# Patient Record
Sex: FEMALE | Race: WHITE | NOT HISPANIC OR LATINO | URBAN - METROPOLITAN AREA
[De-identification: names, ages, dates, MRNs, and addresses within clinical notes are randomized per-mention and may not be internally consistent; named-entity substitution may affect disease eponyms.]

---

## 2023-03-02 ENCOUNTER — HOSPITAL ENCOUNTER (INPATIENT)
Facility: HOSPITAL | Age: 28
LOS: 2 days | Discharge: HOME | End: 2023-03-04
Attending: OBSTETRICS & GYNECOLOGY | Admitting: STUDENT IN AN ORGANIZED HEALTH CARE EDUCATION/TRAINING PROGRAM

## 2023-03-02 ENCOUNTER — ANESTHESIA (INPATIENT)
Dept: ANESTHESIOLOGY | Facility: HOSPITAL | Age: 28
End: 2023-03-02

## 2023-03-02 ENCOUNTER — ANESTHESIA EVENT (INPATIENT)
Dept: ANESTHESIOLOGY | Facility: HOSPITAL | Age: 28
End: 2023-03-02

## 2023-03-02 DIAGNOSIS — D64.9 ANEMIA, UNSPECIFIED TYPE: ICD-10-CM

## 2023-03-02 PROBLEM — F17.200 SMOKING: Status: ACTIVE | Noted: 2023-03-02

## 2023-03-02 PROBLEM — O09.30 NO PRENATAL CARE IN CURRENT PREGNANCY: Status: ACTIVE | Noted: 2023-03-02

## 2023-03-02 PROBLEM — O60.00 PRETERM LABOR: Status: ACTIVE | Noted: 2023-03-02

## 2023-03-02 PROBLEM — R03.0 ELEVATED BLOOD PRESSURE READING WITHOUT DIAGNOSIS OF HYPERTENSION: Status: ACTIVE | Noted: 2023-03-02

## 2023-03-02 PROBLEM — O14.10 SEVERE PREECLAMPSIA: Status: ACTIVE | Noted: 2023-03-02

## 2023-03-02 LAB
ABO GROUP BLD: NORMAL
ABO GROUP BLD: NORMAL
ALBUMIN SERPL BCP-MCNC: 2.3 G/DL (ref 3.5–5)
ALBUMIN SERPL BCP-MCNC: 2.6 G/DL (ref 3.5–5)
ALBUMIN SERPL BCP-MCNC: 2.9 G/DL (ref 3.5–5)
ALP SERPL-CCNC: 164 U/L (ref 34–104)
ALP SERPL-CCNC: 203 U/L (ref 34–104)
ALP SERPL-CCNC: 224 U/L (ref 34–104)
ALT SERPL W P-5'-P-CCNC: 10 U/L (ref 7–52)
ALT SERPL W P-5'-P-CCNC: 10 U/L (ref 7–52)
ALT SERPL W P-5'-P-CCNC: 9 U/L (ref 7–52)
AMPHETAMINES SERPL QL SCN: POSITIVE
AMPHETAMINES SERPL QL SCN: POSITIVE
ANION GAP SERPL CALCULATED.3IONS-SCNC: 11 MMOL/L (ref 4–13)
ANION GAP SERPL CALCULATED.3IONS-SCNC: 8 MMOL/L (ref 4–13)
ANION GAP SERPL CALCULATED.3IONS-SCNC: 8 MMOL/L (ref 4–13)
APTT PPP: 24 SECONDS (ref 23–37)
AST SERPL W P-5'-P-CCNC: 15 U/L (ref 13–39)
AST SERPL W P-5'-P-CCNC: 16 U/L (ref 13–39)
AST SERPL W P-5'-P-CCNC: 17 U/L (ref 13–39)
BACTERIA UR QL AUTO: ABNORMAL /HPF
BARBITURATES UR QL: NEGATIVE
BARBITURATES UR QL: NEGATIVE
BASE EXCESS BLDCOA CALC-SCNC: -3.6 MMOL/L (ref 3–11)
BASE EXCESS BLDCOV CALC-SCNC: -2.5 MMOL/L (ref 1–9)
BENZODIAZ UR QL: NEGATIVE
BENZODIAZ UR QL: NEGATIVE
BILIRUB SERPL-MCNC: 0.29 MG/DL (ref 0.2–1)
BILIRUB SERPL-MCNC: 0.31 MG/DL (ref 0.2–1)
BILIRUB SERPL-MCNC: 0.41 MG/DL (ref 0.2–1)
BILIRUB UR QL STRIP: NEGATIVE
BLD GP AB SCN SERPL QL: NEGATIVE
BUN SERPL-MCNC: 10 MG/DL (ref 5–25)
BUN SERPL-MCNC: 10 MG/DL (ref 5–25)
BUN SERPL-MCNC: 9 MG/DL (ref 5–25)
CALCIUM ALBUM COR SERPL-MCNC: 8.4 MG/DL (ref 8.3–10.1)
CALCIUM ALBUM COR SERPL-MCNC: 8.9 MG/DL (ref 8.3–10.1)
CALCIUM ALBUM COR SERPL-MCNC: 9.7 MG/DL (ref 8.3–10.1)
CALCIUM SERPL-MCNC: 7 MG/DL (ref 8.4–10.2)
CALCIUM SERPL-MCNC: 8 MG/DL (ref 8.4–10.2)
CALCIUM SERPL-MCNC: 8.6 MG/DL (ref 8.4–10.2)
CHLORIDE SERPL-SCNC: 103 MMOL/L (ref 96–108)
CHLORIDE SERPL-SCNC: 103 MMOL/L (ref 96–108)
CHLORIDE SERPL-SCNC: 105 MMOL/L (ref 96–108)
CLARITY UR: CLEAR
CO2 SERPL-SCNC: 20 MMOL/L (ref 21–32)
CO2 SERPL-SCNC: 22 MMOL/L (ref 21–32)
CO2 SERPL-SCNC: 23 MMOL/L (ref 21–32)
COCAINE UR QL: NEGATIVE
COCAINE UR QL: NEGATIVE
COLOR UR: ABNORMAL
CREAT SERPL-MCNC: 0.53 MG/DL (ref 0.6–1.3)
CREAT SERPL-MCNC: 0.59 MG/DL (ref 0.6–1.3)
CREAT SERPL-MCNC: 0.7 MG/DL (ref 0.6–1.3)
CREAT UR-MCNC: 107.1 MG/DL
ERYTHROCYTE [DISTWIDTH] IN BLOOD BY AUTOMATED COUNT: 14.2 % (ref 11.6–15.1)
ERYTHROCYTE [DISTWIDTH] IN BLOOD BY AUTOMATED COUNT: 14.5 % (ref 11.6–15.1)
ERYTHROCYTE [DISTWIDTH] IN BLOOD BY AUTOMATED COUNT: 14.7 % (ref 11.6–15.1)
EXTERNAL GROUP B STREP ANTIGEN: NORMAL
FIBRINOGEN PPP-MCNC: 508 MG/DL (ref 227–495)
GFR SERPL CREATININE-BSD FRML MDRD: 119 ML/MIN/1.73SQ M
GFR SERPL CREATININE-BSD FRML MDRD: 126 ML/MIN/1.73SQ M
GFR SERPL CREATININE-BSD FRML MDRD: 130 ML/MIN/1.73SQ M
GLUCOSE SERPL-MCNC: 102 MG/DL (ref 65–140)
GLUCOSE SERPL-MCNC: 103 MG/DL (ref 65–140)
GLUCOSE SERPL-MCNC: 104 MG/DL (ref 65–140)
GLUCOSE SERPL-MCNC: 105 MG/DL (ref 65–140)
GLUCOSE SERPL-MCNC: 108 MG/DL (ref 65–140)
GLUCOSE SERPL-MCNC: 109 MG/DL (ref 65–140)
GLUCOSE SERPL-MCNC: 110 MG/DL (ref 65–140)
GLUCOSE SERPL-MCNC: 111 MG/DL (ref 65–140)
GLUCOSE SERPL-MCNC: 114 MG/DL (ref 65–140)
GLUCOSE SERPL-MCNC: 114 MG/DL (ref 65–140)
GLUCOSE SERPL-MCNC: 162 MG/DL (ref 65–140)
GLUCOSE SERPL-MCNC: 83 MG/DL (ref 65–140)
GLUCOSE SERPL-MCNC: 92 MG/DL (ref 65–140)
GLUCOSE UR STRIP-MCNC: NEGATIVE MG/DL
HBV SURFACE AG SER QL: NORMAL
HCO3 BLDCOA-SCNC: 23.7 MMOL/L (ref 17.3–27.3)
HCO3 BLDCOV-SCNC: 23.6 MMOL/L (ref 12.2–28.6)
HCT VFR BLD AUTO: 24.9 % (ref 34.8–46.1)
HCT VFR BLD AUTO: 30.1 % (ref 34.8–46.1)
HCT VFR BLD AUTO: 34.1 % (ref 34.8–46.1)
HCV AB SER QL: NORMAL
HGB BLD-MCNC: 10.6 G/DL (ref 11.5–15.4)
HGB BLD-MCNC: 7.7 G/DL (ref 11.5–15.4)
HGB BLD-MCNC: 9.5 G/DL (ref 11.5–15.4)
HGB UR QL STRIP.AUTO: ABNORMAL
HIV 1+2 AB+HIV1 P24 AG SERPL QL IA: NORMAL
HIV1 P24 AG SER QL: NORMAL
HYALINE CASTS #/AREA URNS LPF: ABNORMAL /LPF
INR PPP: 0.91 (ref 0.84–1.19)
KETONES UR STRIP-MCNC: NEGATIVE MG/DL
LEUKOCYTE ESTERASE UR QL STRIP: NEGATIVE
MCH RBC QN AUTO: 26.8 PG (ref 26.8–34.3)
MCH RBC QN AUTO: 27.1 PG (ref 26.8–34.3)
MCH RBC QN AUTO: 27.3 PG (ref 26.8–34.3)
MCHC RBC AUTO-ENTMCNC: 30.9 G/DL (ref 31.4–37.4)
MCHC RBC AUTO-ENTMCNC: 31.1 G/DL (ref 31.4–37.4)
MCHC RBC AUTO-ENTMCNC: 31.6 G/DL (ref 31.4–37.4)
MCV RBC AUTO: 86 FL (ref 82–98)
MCV RBC AUTO: 86 FL (ref 82–98)
MCV RBC AUTO: 88 FL (ref 82–98)
METHADONE UR QL: NEGATIVE
METHADONE UR QL: NEGATIVE
MUCOUS THREADS UR QL AUTO: ABNORMAL
NITRITE UR QL STRIP: NEGATIVE
NON-SQ EPI CELLS URNS QL MICRO: ABNORMAL /HPF
O2 CT VFR BLDCOA CALC: 8.4 ML/DL
OPIATES UR QL SCN: NEGATIVE
OPIATES UR QL SCN: NEGATIVE
OXYCODONE+OXYMORPHONE UR QL SCN: NEGATIVE
OXYCODONE+OXYMORPHONE UR QL SCN: NEGATIVE
OXYHGB MFR BLDCOA: 37.4 %
OXYHGB MFR BLDCOV: 65.3 %
PCO2 BLDCOA: 51.1 MM[HG] (ref 30–60)
PCO2 BLDCOV: 45.4 MM HG (ref 27–43)
PCP UR QL: NEGATIVE
PCP UR QL: NEGATIVE
PH BLDCOA: 7.28 [PH] (ref 7.23–7.43)
PH BLDCOV: 7.33 [PH] (ref 7.19–7.49)
PH UR STRIP.AUTO: 6.5 [PH]
PLATELET # BLD AUTO: 203 THOUSANDS/UL (ref 149–390)
PLATELET # BLD AUTO: 228 THOUSANDS/UL (ref 149–390)
PLATELET # BLD AUTO: 243 THOUSANDS/UL (ref 149–390)
PMV BLD AUTO: 12 FL (ref 8.9–12.7)
PMV BLD AUTO: 12 FL (ref 8.9–12.7)
PMV BLD AUTO: 12.4 FL (ref 8.9–12.7)
PO2 BLDCOA: 18.5 MM HG (ref 5–25)
PO2 BLDCOV: 27.5 MM HG (ref 15–45)
POTASSIUM SERPL-SCNC: 4.3 MMOL/L (ref 3.5–5.3)
POTASSIUM SERPL-SCNC: 4.4 MMOL/L (ref 3.5–5.3)
POTASSIUM SERPL-SCNC: 4.8 MMOL/L (ref 3.5–5.3)
PROT SERPL-MCNC: 5 G/DL (ref 6.4–8.4)
PROT SERPL-MCNC: 5.7 G/DL (ref 6.4–8.4)
PROT SERPL-MCNC: 6.6 G/DL (ref 6.4–8.4)
PROT UR STRIP-MCNC: ABNORMAL MG/DL
PROT UR-MCNC: 467 MG/DL
PROT/CREAT UR: 4.36 MG/G{CREAT} (ref 0–0.1)
PROTHROMBIN TIME: 12.5 SECONDS (ref 11.6–14.5)
RBC # BLD AUTO: 2.82 MILLION/UL (ref 3.81–5.12)
RBC # BLD AUTO: 3.51 MILLION/UL (ref 3.81–5.12)
RBC # BLD AUTO: 3.95 MILLION/UL (ref 3.81–5.12)
RBC #/AREA URNS AUTO: ABNORMAL /HPF
RH BLD: POSITIVE
RH BLD: POSITIVE
RUBV IGG SERPL IA-ACNC: 12.6 IU/ML
SAO2 % BLDCOV: 14.2 ML/DL
SODIUM SERPL-SCNC: 133 MMOL/L (ref 135–147)
SODIUM SERPL-SCNC: 134 MMOL/L (ref 135–147)
SODIUM SERPL-SCNC: 136 MMOL/L (ref 135–147)
SP GR UR STRIP.AUTO: 1.02 (ref 1–1.03)
SPECIMEN EXPIRATION DATE: NORMAL
THC UR QL: NEGATIVE
THC UR QL: NEGATIVE
TREPONEMA PALLIDUM IGG+IGM AB [PRESENCE] IN SERUM OR PLASMA BY IMMUNOASSAY: NORMAL
UROBILINOGEN UR STRIP-ACNC: <2 MG/DL
WBC # BLD AUTO: 12.05 THOUSAND/UL (ref 4.31–10.16)
WBC # BLD AUTO: 16.75 THOUSAND/UL (ref 4.31–10.16)
WBC # BLD AUTO: 28.86 THOUSAND/UL (ref 4.31–10.16)
WBC #/AREA URNS AUTO: ABNORMAL /HPF

## 2023-03-02 PROCEDURE — 0KQM0ZZ REPAIR PERINEUM MUSCLE, OPEN APPROACH: ICD-10-PCS | Performed by: OBSTETRICS & GYNECOLOGY

## 2023-03-02 RX ORDER — LABETALOL HYDROCHLORIDE 5 MG/ML
40 INJECTION, SOLUTION INTRAVENOUS ONCE
Status: COMPLETED | OUTPATIENT
Start: 2023-03-02 | End: 2023-03-02

## 2023-03-02 RX ORDER — MAGNESIUM SULFATE HEPTAHYDRATE 40 MG/ML
2 INJECTION, SOLUTION INTRAVENOUS ONCE
Status: COMPLETED | OUTPATIENT
Start: 2023-03-02 | End: 2023-03-02

## 2023-03-02 RX ORDER — OXYTOCIN/RINGER'S LACTATE 30/500 ML
250 PLASTIC BAG, INJECTION (ML) INTRAVENOUS ONCE
Status: COMPLETED | OUTPATIENT
Start: 2023-03-02 | End: 2023-03-02

## 2023-03-02 RX ORDER — OXYTOCIN/RINGER'S LACTATE 30/500 ML
PLASTIC BAG, INJECTION (ML) INTRAVENOUS
Status: COMPLETED
Start: 2023-03-02 | End: 2023-03-02

## 2023-03-02 RX ORDER — OXYTOCIN/RINGER'S LACTATE 30/500 ML
1-30 PLASTIC BAG, INJECTION (ML) INTRAVENOUS
Status: DISCONTINUED | OUTPATIENT
Start: 2023-03-02 | End: 2023-03-03

## 2023-03-02 RX ORDER — ROPIVACAINE HYDROCHLORIDE 2 MG/ML
INJECTION, SOLUTION EPIDURAL; INFILTRATION; PERINEURAL AS NEEDED
Status: DISCONTINUED | OUTPATIENT
Start: 2023-03-02 | End: 2023-03-21 | Stop reason: HOSPADM

## 2023-03-02 RX ORDER — SODIUM CHLORIDE, SODIUM LACTATE, POTASSIUM CHLORIDE, CALCIUM CHLORIDE 600; 310; 30; 20 MG/100ML; MG/100ML; MG/100ML; MG/100ML
125 INJECTION, SOLUTION INTRAVENOUS CONTINUOUS
Status: DISCONTINUED | OUTPATIENT
Start: 2023-03-02 | End: 2023-03-03

## 2023-03-02 RX ORDER — DIAPER,BRIEF,INFANT-TODD,DISP
1 EACH MISCELLANEOUS DAILY PRN
Status: DISCONTINUED | OUTPATIENT
Start: 2023-03-02 | End: 2023-03-04 | Stop reason: HOSPADM

## 2023-03-02 RX ORDER — MAGNESIUM SULFATE HEPTAHYDRATE 40 MG/ML
4 INJECTION, SOLUTION INTRAVENOUS ONCE
Status: COMPLETED | OUTPATIENT
Start: 2023-03-02 | End: 2023-03-02

## 2023-03-02 RX ORDER — CALCIUM GLUCONATE 94 MG/ML
1 INJECTION, SOLUTION INTRAVENOUS ONCE AS NEEDED
Status: DISCONTINUED | OUTPATIENT
Start: 2023-03-02 | End: 2023-03-04 | Stop reason: HOSPADM

## 2023-03-02 RX ORDER — LABETALOL HYDROCHLORIDE 5 MG/ML
20 INJECTION, SOLUTION INTRAVENOUS ONCE
Status: COMPLETED | OUTPATIENT
Start: 2023-03-02 | End: 2023-03-02

## 2023-03-02 RX ORDER — LIDOCAINE HYDROCHLORIDE AND EPINEPHRINE 15; 5 MG/ML; UG/ML
INJECTION, SOLUTION EPIDURAL AS NEEDED
Status: DISCONTINUED | OUTPATIENT
Start: 2023-03-02 | End: 2023-03-21 | Stop reason: HOSPADM

## 2023-03-02 RX ORDER — ONDANSETRON 2 MG/ML
4 INJECTION INTRAMUSCULAR; INTRAVENOUS EVERY 8 HOURS PRN
Status: DISCONTINUED | OUTPATIENT
Start: 2023-03-02 | End: 2023-03-04 | Stop reason: HOSPADM

## 2023-03-02 RX ORDER — CALCIUM CARBONATE 200(500)MG
750 TABLET,CHEWABLE ORAL 3 TIMES DAILY PRN
Status: DISCONTINUED | OUTPATIENT
Start: 2023-03-02 | End: 2023-03-02

## 2023-03-02 RX ORDER — ACETAMINOPHEN 325 MG/1
650 TABLET ORAL EVERY 4 HOURS PRN
Status: DISCONTINUED | OUTPATIENT
Start: 2023-03-02 | End: 2023-03-04 | Stop reason: HOSPADM

## 2023-03-02 RX ORDER — DOCUSATE SODIUM 100 MG/1
100 CAPSULE, LIQUID FILLED ORAL 2 TIMES DAILY
Status: DISCONTINUED | OUTPATIENT
Start: 2023-03-02 | End: 2023-03-04 | Stop reason: HOSPADM

## 2023-03-02 RX ORDER — DEXTROSE AND SODIUM CHLORIDE 5; .9 G/100ML; G/100ML
100 INJECTION, SOLUTION INTRAVENOUS CONTINUOUS
Status: DISCONTINUED | OUTPATIENT
Start: 2023-03-02 | End: 2023-03-03

## 2023-03-02 RX ORDER — LIDOCAINE HYDROCHLORIDE 10 MG/ML
INJECTION, SOLUTION EPIDURAL; INFILTRATION; INTRACAUDAL; PERINEURAL
Status: COMPLETED | OUTPATIENT
Start: 2023-03-02 | End: 2023-03-02

## 2023-03-02 RX ORDER — IBUPROFEN 600 MG/1
600 TABLET ORAL EVERY 6 HOURS
Status: DISCONTINUED | OUTPATIENT
Start: 2023-03-02 | End: 2023-03-04 | Stop reason: HOSPADM

## 2023-03-02 RX ORDER — ACETAMINOPHEN 325 MG/1
650 TABLET ORAL EVERY 6 HOURS PRN
Status: DISCONTINUED | OUTPATIENT
Start: 2023-03-02 | End: 2023-03-02

## 2023-03-02 RX ORDER — CALCIUM CARBONATE 200(500)MG
1000 TABLET,CHEWABLE ORAL 3 TIMES DAILY PRN
Status: DISCONTINUED | OUTPATIENT
Start: 2023-03-02 | End: 2023-03-04 | Stop reason: HOSPADM

## 2023-03-02 RX ORDER — MAGNESIUM SULFATE HEPTAHYDRATE 40 MG/ML
2 INJECTION, SOLUTION INTRAVENOUS CONTINUOUS
Status: DISCONTINUED | OUTPATIENT
Start: 2023-03-02 | End: 2023-03-03

## 2023-03-02 RX ORDER — BETAMETHASONE SODIUM PHOSPHATE AND BETAMETHASONE ACETATE 3; 3 MG/ML; MG/ML
12 INJECTION, SUSPENSION INTRA-ARTICULAR; INTRALESIONAL; INTRAMUSCULAR; SOFT TISSUE EVERY 24 HOURS
Status: DISPENSED | OUTPATIENT
Start: 2023-03-02 | End: 2023-03-04

## 2023-03-02 RX ORDER — BUPIVACAINE HYDROCHLORIDE 2.5 MG/ML
30 INJECTION, SOLUTION EPIDURAL; INFILTRATION; INTRACAUDAL ONCE AS NEEDED
Status: DISCONTINUED | OUTPATIENT
Start: 2023-03-02 | End: 2023-03-03

## 2023-03-02 RX ORDER — SODIUM CHLORIDE, SODIUM LACTATE, POTASSIUM CHLORIDE, CALCIUM CHLORIDE 600; 310; 30; 20 MG/100ML; MG/100ML; MG/100ML; MG/100ML
50 INJECTION, SOLUTION INTRAVENOUS CONTINUOUS
Status: DISCONTINUED | OUTPATIENT
Start: 2023-03-02 | End: 2023-03-03

## 2023-03-02 RX ADMIN — LIDOCAINE HYDROCHLORIDE AND EPINEPHRINE 3 ML: 15; 5 INJECTION, SOLUTION EPIDURAL at 11:28

## 2023-03-02 RX ADMIN — ROPIVACAINE HYDROCHLORIDE 9 ML: 2 INJECTION, SOLUTION EPIDURAL; INFILTRATION at 08:00

## 2023-03-02 RX ADMIN — ROPIVACAINE HYDROCHLORIDE 9 ML: 2 INJECTION, SOLUTION EPIDURAL; INFILTRATION at 11:32

## 2023-03-02 RX ADMIN — Medication 40 MG: at 06:05

## 2023-03-02 RX ADMIN — Medication 1 APPLICATION.: at 19:14

## 2023-03-02 RX ADMIN — BETAMETHASONE SODIUM PHOSPHATE AND BETAMETHASONE ACETATE 12 MG: 3; 3 INJECTION, SUSPENSION INTRA-ARTICULAR; INTRALESIONAL; INTRAMUSCULAR at 05:19

## 2023-03-02 RX ADMIN — LIDOCAINE HYDROCHLORIDE AND EPINEPHRINE 3 ML: 15; 5 INJECTION, SOLUTION EPIDURAL at 07:55

## 2023-03-02 RX ADMIN — ACETAMINOPHEN 650 MG: 325 TABLET, FILM COATED ORAL at 15:30

## 2023-03-02 RX ADMIN — ROPIVACAINE HYDROCHLORIDE: 2 INJECTION, SOLUTION EPIDURAL; INFILTRATION at 08:00

## 2023-03-02 RX ADMIN — HYDROCORTISONE 1 APPLICATION.: 1 CREAM TOPICAL at 19:15

## 2023-03-02 RX ADMIN — Medication 250 MILLI-UNITS/MIN: at 16:42

## 2023-03-02 RX ADMIN — LIDOCAINE HYDROCHLORIDE 3 ML: 10 INJECTION, SOLUTION EPIDURAL; INFILTRATION; INTRACAUDAL; PERINEURAL at 07:50

## 2023-03-02 RX ADMIN — SODIUM CHLORIDE, SODIUM LACTATE, POTASSIUM CHLORIDE, AND CALCIUM CHLORIDE 50 ML/HR: .6; .31; .03; .02 INJECTION, SOLUTION INTRAVENOUS at 08:00

## 2023-03-02 RX ADMIN — ROPIVACAINE HYDROCHLORIDE: 2 INJECTION, SOLUTION EPIDURAL; INFILTRATION at 11:37

## 2023-03-02 RX ADMIN — Medication 2 MILLI-UNITS/MIN: at 10:26

## 2023-03-02 RX ADMIN — SODIUM CHLORIDE 5 MILLION UNITS: 0.9 INJECTION, SOLUTION INTRAVENOUS at 05:28

## 2023-03-02 RX ADMIN — MAGNESIUM SULFATE HEPTAHYDRATE 2 G/HR: 40 INJECTION, SOLUTION INTRAVENOUS at 15:57

## 2023-03-02 RX ADMIN — SODIUM CHLORIDE 2.5 MILLION UNITS: 9 INJECTION, SOLUTION INTRAVENOUS at 13:34

## 2023-03-02 RX ADMIN — MAGNESIUM SULFATE HEPTAHYDRATE 2 G/HR: 40 INJECTION, SOLUTION INTRAVENOUS at 06:27

## 2023-03-02 RX ADMIN — SODIUM CHLORIDE 2.5 MILLION UNITS: 9 INJECTION, SOLUTION INTRAVENOUS at 09:29

## 2023-03-02 RX ADMIN — MAGNESIUM SULFATE HEPTAHYDRATE 2 G: 40 INJECTION, SOLUTION INTRAVENOUS at 06:14

## 2023-03-02 RX ADMIN — SODIUM CHLORIDE 0.5 UNITS/HR: 9 INJECTION, SOLUTION INTRAVENOUS at 12:20

## 2023-03-02 RX ADMIN — Medication 20 MG: at 05:42

## 2023-03-02 RX ADMIN — MAGNESIUM SULFATE HEPTAHYDRATE 4 G: 40 INJECTION, SOLUTION INTRAVENOUS at 05:54

## 2023-03-02 RX ADMIN — DEXTROSE AND SODIUM CHLORIDE 100 ML/HR: 5; .9 INJECTION, SOLUTION INTRAVENOUS at 12:19

## 2023-03-02 RX ADMIN — SODIUM CHLORIDE, SODIUM LACTATE, POTASSIUM CHLORIDE, AND CALCIUM CHLORIDE 925 ML/HR: .6; .31; .03; .02 INJECTION, SOLUTION INTRAVENOUS at 05:11

## 2023-03-02 NOTE — DISCHARGE SUMMARY
Discharge Summary - [facility] 32 [facility] MRN: [id number]  Unit/Bed#: -01 Encounter: [id number]      Admission Date: 3/2/2023     Discharge Date: 23     Admitting Diagnosis:   1  Pregnancy at 36w5d  2  No prenatal care in current pregnancy  3  Anemia  4  Severe preeclampsia  5  Smoking    Discharge Diagnosis:   Same, delivered    Procedures: spontaneous vaginal delivery    Delivering Attending: Shaan Beth MD  Discharge Attending: Gus Upton Course:   Dyana Fleming is a 32 y o   female at 44w9d, by patient report, who was admitted to L&D for labor  She was 3/60/-2 on admission and was noted to have severe range blood pressures requiring magnesium infusion  She received one dose of betamethasone due to uncertainty of gestational age  She was GBS unknown and started on Penicillin to prophylaxis  Her blood glucose levels were noted to be elevated and she was started on an insulin drip  Her labor course was notable for epidural placement, augmentation with pitocin titration and AROM due to arrest of dilation  She progressed to complete at 1620, pushed for 8 min, and delivered a healthy  at   She delivered a viable female  on 3/2/23 at   Weight 6lbs 2 6oz via spontaneous vaginal delivery  Apgars were 8 (1 min) and 9 (5 min)   was transferred to  nursery  Patient tolerated the procedure well and was transferred to recovery in stable condition  Her post-partum course was complicated by UDS positive for amphetamines, cleared by case management for discharge  Her post-partum pain was well controlled with oral analgesics  On day of discharge, she was ambulating and able to reasonably perform all ADLs  She was voiding and had appropriate bowel function  Pain was well controlled  She was discharged home on post-partum day #2 without complications   Patient was instructed to follow up with her OB as an outpatient and was given appropriate warnings to call provider if she develops signs of infection or uncontrolled pain  Complications: none apparent    Condition at discharge: good     Discharge instructions/Information to patient and family:   See after visit summary for information provided to patient and family  Provisions for Follow-Up Care:  See after visit summary for information related to follow-up care and any pertinent home health orders  Disposition: See After Visit Summary for discharge disposition information  Planned Readmission: No    Discharge Medications: For a complete list of the patient's medications, please refer to her med rec      Micaela Ledezma MD  OBGYN Resident  03/04/23  11:33 AM

## 2023-03-02 NOTE — PLAN OF CARE
Problem: Labor & Delivery  Goal: Manages discomfort  Description: Assess and monitor for signs and symptoms of discomfort  Assess patient's pain level regularly and per hospital policy  Administer medications as ordered  Support use of nonpharmacological methods to help control pain such as distraction, imagery, relaxation, and application of heat and cold  Collaborate with interdisciplinary team and patient to determine appropriate pain management plan  1  Include patient in decisions related to comfort  2  Offer non-pharmacological pain management interventions  3  Report ineffective pain management to physician  3/2/2023 1701 by Julee Granados RN  Outcome: Completed  3/2/2023 1023 by Julee Granados RN  Outcome: Progressing  Goal: Patient vital signs are stable  Description: 1  Assess vital signs - vaginal delivery    3/2/2023 1701 by Julee Granados RN  Outcome: Completed  3/2/2023 1023 by Julee Granados RN  Outcome: Progressing

## 2023-03-02 NOTE — ASSESSMENT & PLAN NOTE
Prenatal labs wnl, aside from anemia and proteinuria  GBS pending, received PCN for ppx  Positive amphetamines on UDS  Case management saw her, cleared per patient though awaiting confirmation from CM directly

## 2023-03-02 NOTE — OB LABOR/OXYTOCIN SAFETY PROGRESS
Labor Progress Note - Reese Araujo 32 y o  female MRN: [id number]    Unit/Bed#:  TRIAGE 4-01 Encounter: [id number]       Contraction Frequency (minutes): 3-5  Contraction Quality: Strong      Cervical Dilation: 4-5        Cervical Effacement: 70  Fetal Station: -2  Baseline Rate: 130 bpm     FHR Category: Category I               Vital Signs:   Vitals:    03/02/23 0608   BP: 146/87   Pulse: 81   Resp:    Temp:    SpO2:        Notes/comments:   Patient is becoming more uncomfortable  SVE as above  Continue to monitor  FHT cat 1           Koko Sams MD 3/2/2023 6:39 AM

## 2023-03-02 NOTE — ANESTHESIA PROCEDURE NOTES
Epidural Block    Patient location during procedure: OB  Start time: 3/2/2023 11:23 AM  Staffing  Performed: Anesthesiologist   Anesthesiologist: Benny David MD  Preanesthetic Checklist  Completed: patient identified, IV checked, risks and benefits discussed, surgical consent, monitors and equipment checked, pre-op evaluation and timeout performed  Epidural  Patient position: sitting  Prep: Betadine  Patient monitoring: frequent blood pressure checks, continuous pulse ox and heart rate  Approach: midline  Location: lumbar  Injection technique: LILIAN saline  Needle  Needle type: Tuohy   Needle gauge: 18 G  Catheter size: 20 G  Catheter at skin depth: 13 cm  Catheter securement method: stabilization device  Test dose: negative  Assessment  Number of attempts: 1negative aspiration for CSF, negative aspiration for heme and no paresthesia on injection  patient tolerated the procedure well with no immediate complications

## 2023-03-02 NOTE — L&D DELIVERY NOTE
DELIVERY NOTE  Brandy Osborn 32 y o  female MRN: [id number]  Unit/Bed#: -01 Encounter: [id number]    Obstetrician:    Dr Trish Rodriguez MD    Assistant:   Dr Gladys Weaver MD    Pre-Delivery Diagnosis:   Patient Active Problem List   Diagnosis   • Labor   • No prenatal care in current pregnancy   • Severe preeclampsia   • Anemia   • Smoking       Post-Delivery Diagnosis:   Same as above - Delivered  2nd degree laceration  R Labial tear    Procedure:  Spontaneous vaginal delivery  Repair 2nd degree and right labial spontaneous laceration      Anesthesia:  epidural    Specimens:   Cord blood obtained   Placenta; normal appearing, central insertion, intact   Arterial and venous blood gases (below)     Gases:  Umbilical Cord Venous Blood Gas:  Results from last 7 days   Lab Units 23  1645   PH COV  7 333   PCO2 COV mm HG 45 4*   HCO3 COV mmol/L 23 6   BASE EXC COV mmol/L -2 5*   O2 CT CD VB mL/dL 14 2   O2 HGB, VENOUS CORD % 88 9     Umbilical Cord Arterial Blood Gas:  Results from last 7 days   Lab Units 23  1645   PH COA  7 284   PCO2 COA  51 1   PO2 COA mm HG 18 5   HCO3 COA mmol/L 23 7   BASE EXC COA mmol/L -3 6*   O2 CONTENT CORD ART ml/dl 8 4   O2 HGB, ARTERIAL CORD % 37 4       Quantitative Blood Loss:   782 mL           Complications:    None    Brief Description of Labor Course:  Brandy Osborn is a 32 y o   female at 44w9d, by patient report, who was admitted to L&D for labor  She was 3/60/-2 on admission and was noted to have severe range blood pressures requiring magnesium infusion  She was GBS unknown and started on Penicillin to prophylaxis  Her blood glucose levels were noted to be elevated and she was started on an insulin drip  Her labor course was notable for epidural placement, augmentation with pitocin titration and AROM due to arrest of dilation  She progressed to complete at 1620, pushed for 8 min, and delivered a healthy  at        Description of Delivery:   With  the assistance of maternal expulsive forces, the fetal vertex delivered spontaneously  A nuchal cord was noted and reduced  The anterior left shoulder was delivered atraumatically with gentle downward traction  The contralateral arm was delivered with gentle upward traction  The remainder of the fetus delivered spontaneously at 36, resulting in a viable female   Upon delivery, the infant was placed on the mothers abdomen and the cord was doubly clamped and cut after 30 seconds  The  was noted to have good tone and cry spontaneously  There was no evidence of injury  The  was passed off to  staff for evaluation  Umbilical cord blood and umbilical artery and venous gases were collected and sent to the lab  An intact placenta was delivered spontaneously at 1644 using fundal massage and gentle cord traction and was noted to have a centrally-inserted 3-vessel cord  Active management of the third stage of labor was undertaken with IV pitocin at 250milliunits/min  Inspection of the perineum, vagina, labia, cervix, and urethra revealed a 2nd degree and right labial laceration  Bleeding was noted to be under control  A bimanual exam was performed   Outcome:  Living  with APGARS 8 (1 min) and 9 (5 min)   weight: 6lb 2 6oz    Perineal Inspection/Laceration Repair  Inspection of the perineum, vagina, labia, cervix, and urethra revealed a 2nd degree and right labial laceration, which were repaired in standard fashion with 3-0 Vicryl rapide  Patient was comfortable with epidural at that time  The laceration showed good tissue reapproximation and hemostasis  At the conclusion of the delivery, all needle, sponge, and instrument counts were noted to be correct  Patient tolerated the procedure well and was allowed to recover in labor and delivery room with family and  before being transferred to the post-partum floor  Conclusion:  Mother and baby are currently recovering nicely in stable condition  Attending Supervision:   Dr Azam Garcia MD was present for the entire procedure      Etienne Keene MD   OB/GYN PGY-1   3/2/2023 5:13 PM

## 2023-03-02 NOTE — ANESTHESIA POSTPROCEDURE EVALUATION
Post-Op Assessment Note    CV Status:  Stable  Pain Score: 0    Pain management: adequate     Mental Status:  Awake and alert   Hydration Status:  Stable   PONV Controlled:  None   Airway Patency:  Patent      Post Op Vitals Reviewed: Yes      Staff: CRNA         No notable events documented  Epidural catheter removed, tip intact  No complications       /76 (03/02/23 1825)    Temp      Pulse (!) 113 (03/02/23 1825)   Resp 16 (03/02/23 1825)    SpO2

## 2023-03-02 NOTE — ASSESSMENT & PLAN NOTE
Admit to OBGYN   Clear liquid diet   IVF LR 125cc/hr   Continuous fetal monitoring and tocometry   Analgesia at maternal request   Vertex by TAUS  Patient offered BTM due to unknown gestation- accepted

## 2023-03-02 NOTE — PLAN OF CARE
Problem: Labor & Delivery  Goal: Manages discomfort  Description: Assess and monitor for signs and symptoms of discomfort  Assess patient's pain level regularly and per hospital policy  Administer medications as ordered  Support use of nonpharmacological methods to help control pain such as distraction, imagery, relaxation, and application of heat and cold  Collaborate with interdisciplinary team and patient to determine appropriate pain management plan  1  Include patient in decisions related to comfort  2  Offer non-pharmacological pain management interventions  3  Report ineffective pain management to physician  Outcome: Progressing  Goal: Patient vital signs are stable  Description: 1  Assess vital signs - vaginal delivery    Outcome: Progressing

## 2023-03-02 NOTE — OB LABOR/OXYTOCIN SAFETY PROGRESS
Labor Progress Note - Guera Lianet 32 y o  female MRN: [id number]    Unit/Bed#: -01 Encounter: [id number]       Contraction Frequency (minutes): 2-4  Contraction Quality: Moderate  Tachysystole: No   Cervical Dilation: 4-5        Cervical Effacement: 70  Fetal Station: -2  Baseline Rate: 130 bpm  Fetal Heart Rate: 130 BPM  FHR Category: Category I               Vital Signs:   Vitals:    03/02/23 1008   BP: 139/85   Pulse: 83   Resp: 16   Temp: 97 9 °F (36 6 °C)   SpO2: 99%       Notes/comments: SVE unchanged  FHT Category I  Plan to start pitocin for labor augmentation secondary to preeclampsia with severe features       D/w Dr Shelley Bravo MD 3/2/2023 10:13 AM

## 2023-03-02 NOTE — OB LABOR/OXYTOCIN SAFETY PROGRESS
Oxytocin Safety Progress Check Note - Ilda Key 32 y o  female MRN: [id number]    Unit/Bed#: -01 Encounter: [id number]    Dose (channing-units/min) Oxytocin: 14 channing-units/min  Contraction Frequency (minutes): 3-4  Contraction Quality: Moderate  Tachysystole: No   Cervical Dilation: 10  Dilation Complete Date: 03/02/23  Dilation Complete Time: 1620  Cervical Effacement: 100  Fetal Station: 2  Baseline Rate: 120 bpm  Fetal Heart Rate: 126 BPM  FHR Category: Category I               Vital Signs:   Vitals:    03/02/23 1607   BP: 127/81   Pulse: 94   Resp:    Temp:    SpO2:        Notes/comments: Patient complete and +2, will start pushing shortly          Krysten Dukes MD 3/2/2023 4:20 PM

## 2023-03-02 NOTE — ASSESSMENT & PLAN NOTE
Patient dx with preeclampsia with severe features (blood pressures)  Denies headache, vision changes, chest pain, shortness of breath, RUQ/epigastric pain  S/p labetalol 20/40 mg IV 3/2/23  S/p 24 hr magnesium for seizure prophylaxis  Asymptomatic      Systolic (70ZLI), CLM:897 , Min:121 , SUMA:345     Diastolic (83IRD), MYR:04, Min:73, Max:87    Urine P:Cr 4 36  Coags & fibrinogen wnl    Lab Results   Component Value Date/Time    HGB 7 0 (L) 03/04/2023 04:35 AM    HGB 7 3 (L) 03/04/2023 12:48 AM     03/04/2023 04:35 AM     03/04/2023 12:48 AM    AST 20 03/04/2023 04:35 AM    AST 21 03/04/2023 12:48 AM    ALT 12 03/04/2023 04:35 AM    ALT 12 03/04/2023 12:48 AM    CREATININE 0 58 (L) 03/04/2023 04:35 AM    CREATININE 0 63 03/04/2023 12:48 AM     · Monitor BP  · Monitor for signs/symptoms of HTN/PreE

## 2023-03-02 NOTE — ANESTHESIA PREPROCEDURE EVALUATION
Procedure:  LABOR ANALGESIA    Relevant Problems   ANESTHESIA (within normal limits)      CARDIO   (+) Severe preeclampsia      HEMATOLOGY   (+) Anemia      NEURO/PSYCH  Tox screen pos for amphetamines      PULMONARY   (+) Smoking      Other   (+) No prenatal care in current pregnancy   (+)  labor        Physical Exam    Airway    Mallampati score: II  TM Distance: >3 FB  Neck ROM: full     Dental   No notable dental hx     Cardiovascular      Pulmonary      Other Findings        Anesthesia Plan  ASA Score- 3     Anesthesia Type- epidural with ASA Monitors  Additional Monitors:   Airway Plan:     Comment: Severe preeclampsia  Plan Factors-Exercise tolerance (METS): >4 METS  Chart reviewed  Existing labs reviewed  Patient summary reviewed  Patient is a current smoker  Patient did not smoke on day of surgery  Induction-     Postoperative Plan-     Informed Consent- Anesthetic plan and risks discussed with patient  I personally reviewed this patient with the CRNA  Discussed and agreed on the Anesthesia Plan with the CRNA  Anders Hammond

## 2023-03-02 NOTE — PLAN OF CARE
Problem: Labor & Delivery  Goal: Manages discomfort  Description: Assess and monitor for signs and symptoms of discomfort  Assess patient's pain level regularly and per hospital policy  Administer medications as ordered  Support use of nonpharmacological methods to help control pain such as distraction, imagery, relaxation, and application of heat and cold  Collaborate with interdisciplinary team and patient to determine appropriate pain management plan  1  Include patient in decisions related to comfort  2  Offer non-pharmacological pain management interventions  3  Report ineffective pain management to physician  3/2/2023 1701 by Megan Jenkins RN  Outcome: Completed  3/2/2023 1023 by Megan Jenkins RN  Outcome: Progressing  Goal: Patient vital signs are stable  Description: 1  Assess vital signs - vaginal delivery    3/2/2023 1701 by Megan Jenkins RN  Outcome: Completed  3/2/2023 1023 by Megan Jenkins, RN  Outcome: Progressing

## 2023-03-02 NOTE — ANESTHESIA PROCEDURE NOTES
Epidural Block    Patient location during procedure: OB  Start time: 3/2/2023 7:51 AM  Reason for block: procedure for pain  Staffing  Performed: CRNA   Resident/CRNA: Juan J Yu CRNA  Preanesthetic Checklist  Completed: patient identified, IV checked, risks and benefits discussed, surgical consent, monitors and equipment checked, pre-op evaluation and timeout performed  Epidural  Patient position: sitting  Prep: ChloraPrep and site prepped and draped  Patient monitoring: continuous pulse ox and heart rate  Approach: midline  Location: lumbar  Injection technique: LILIAN saline  Needle  Needle type: Tuohy   Needle gauge: 18 G  Catheter size: 20 G  Catheter at skin depth: 12 cm  Catheter securement method: stabilization device  Test dose: negativelidocaine (PF) (XYLOCAINE-MPF) 1 % - Infiltration   3 mL - 3/2/2023 7:50:00 AM  Assessment  Number of attempts: 2negative aspiration for CSF, negative aspiration for heme and no paresthesia on injection  patient tolerated the procedure well with no immediate complications

## 2023-03-02 NOTE — H&P
Lyn De La Sarthe [age] y o  female MRN: [id number]  Unit/Bed#: LD TRIAGE  Encounter: [id number]      Assessment: [id number] [facility],[address] 32 y o   at 44w9d (patient reported) admitted for  labor  She is a patient of [address] [place] [address] (default)     Plan:     Anemia  Assessment & Plan  Hgb on presentation 9 5     Severe preeclampsia  Assessment & Plan  Patient dx with preeclampsia with severe, severe traits - blood pressures  Denies headache, vision changes, chest pain, shortness of breath, RUQ/epigastric pain   She has required 20/40mg  IV labetalol for acute antihypertensive treatment   Magnesium 6g bolus followed by 2g/hr   IVF 26LV/LA   Systolic (26HSC), OVM:757 , Min:149 , WKD:582     Diastolic (52ADO), ENN:148, Min:103, Max:120        Lab Results   Component Value Date/Time    HGB 9 5 (L) 2023 05:05 AM     2023 05:05 AM    AST 15 2023 05:05 AM    ALT 9 2023 05:05 AM    CREATININE 0 53 (L) 2023 05:05 AM       No prenatal care in current pregnancy  Assessment & Plan  Prenatal labs collected- follow up   GBS collected- will start PCN due to  gestation   Follow up UDS   Fetus is vertex presentation   Consider MFM ultrasound this morning   Tracing is reactive   Patient reported MARK ANTHONY 3/25/23           *  labor  Assessment & Plan  Admit to OBN   Clear liquid diet   IVF LR 125cc/hr   Continuous fetal monitoring and tocometry   Analgesia at maternal request   Vertex by TAUS  Patient offered BTM due to unknown gestation- accepted             SUBJECTIVE:    Chief Complaint: contractions    HPI: [id number] [facility],[address] is a 32 y o   with an MARK ANTHONY of 3/25//2023, by Patient Reported at 36w3d who is being admitted for  labor  Patient has had no prenatal care  She had one ultrasound around 30 weeks and states she was told her MARK ANTHONY was 3/25/23  Her LMP was sometime in early , she does not know the exact date   This is her first pregnancy  She complains of uterine contractions, occurring every 6 minutes, has no LOF, and reports no VB  She states she has felt good FM  On bedside TAUS placenta is posterior  On presentation she was having severely elevated blood pressures  She denies any medical problems outside of pregnancy  She denies headache, vision changes, chest pain and shortness of breath  She denies substance use in pregnancy  She is a smoker and has been trying to quit  Denies history of STIs  FOB knows she is pregnant but does not want him on birth certificate  She does plan on keeping the pregnancy  Pregnancy Complications/Comments:   No prenatal care    Baby complications/comments: vertex presentation     Review of Systems   Constitutional: Negative for chills and fever  Eyes: Negative for visual disturbance  Respiratory: Negative for shortness of breath  Cardiovascular: Negative for chest pain  Gastrointestinal: Positive for abdominal pain  Negative for nausea and vomiting  Genitourinary: Positive for pelvic pain and vaginal pain  Negative for vaginal bleeding and vaginal discharge  Musculoskeletal: Positive for back pain  Neurological: Negative  OB History    Para Term  AB Living   1             SAB IAB Ectopic Multiple Live Births                  # Outcome Date GA Lbr Issac/2nd Weight Sex Delivery Anes PTL Lv   1 Current                No past surgical history on file  Social History     Tobacco Use   • Smoking status: Not on file   • Smokeless tobacco: Not on file   Substance Use Topics   • Alcohol use: Not on file       No Known Allergies    No medications prior to admission  OBJECTIVE:  Vitals:  Temp:  [98 3 °F (36 8 °C)-98 5 °F (36 9 °C)] 98 3 °F (36 8 °C)  HR:  [] 80  Resp:  [18] 18  BP: (149-179)/(103-120) 162/103  There is no height or weight on file to calculate BMI  Physical Exam:    General Appearance: Awake, alert and not in acute distress     CV: RRR Pulm: Lungs CTA, bilaterally   GI: Gravid Abdomen, Soft, non-tender to palpation in all four quadrants  MSK: Moves upper and lower extremities without difficulty   Lower Extremities: Not edematous, non-erythematous, not painful to palpation      SVE: Cervical Dilation: 3  Cervical Effacement: 60  Fetal Station: -2  OB Examiner: Parth    FHT:  Baseline Rate: 130 bpm  Variability: Moderate 6-25 bpm  Accelerations: 15 x 15 or greater  FHR Category: Category I    TOCO:   Contraction Frequency (minutes): 3-5  Contraction Quality: Strong    Prenatal Labs: Not completed, collected will await results       Dr Mahsa Barnes aware    >2 Osbaldo Morales MD  OB/GYN PGY-3  3/2/2023  5:59 AM

## 2023-03-02 NOTE — ASSESSMENT & PLAN NOTE
Hgb 9 5 --> 10 6 --> 7 7 --> 6 4 --> 1U pBRCs --> 7 9 --> 7 0 (today)  Plts 243  Asymptomatic  VSS  · Will give Venofer today  · Discharge on oral iron

## 2023-03-02 NOTE — OB LABOR/OXYTOCIN SAFETY PROGRESS
Oxytocin Safety Progress Check Note - Clover Gutiérrez 32 y o  female MRN: [id number]    Unit/Bed#: -01 Encounter: [id number]    Dose (channing-units/min) Oxytocin: 10 channing-units/min  Contraction Frequency (minutes): 4-6  Contraction Quality: Mild, Moderate  Tachysystole: No   Cervical Dilation: 5        Cervical Effacement: 90  Fetal Station: -2  Baseline Rate: 130 bpm  Fetal Heart Rate: 127 BPM  FHR Category: Category I               Vital Signs:   Vitals:    03/02/23 1322   BP: 131/75   Pulse: 86   Resp:    Temp:    SpO2:        Notes/comments: SVE as above  Amniotomy for clear/bloody fluid at 1352  FHT Cat I  Patient started on insulin drip for elevated fingerstick glucose levels  Continue to monitor and titrate pitocin          Alfreda Lubin MD 3/2/2023 1:54 PM

## 2023-03-02 NOTE — PROGRESS NOTES
Patients mother pulls me aside outside of room, states that she and her daughter talked about positive UDS for amph/meth  Mother states she was unaware her daughter was doing drugs, and is very tearful about the discovery of this  Patient states she does not do drugs, or has ever used drugs  Patients mother and I go back into room and I talk to patient again about her positive UDS for amph/meth  I ask patient again if there has been any drug use, or  taken adderall or sudafed, because that often can result in a positive test for amph/meth  Patient denies  Discussed with Dr Elaina Chavez and will do another UDS  Patient verbalizes understanding and is agreeable to doing another UDS  I also explained to patient and her mother that it will not change our plan of care for the baby once it is born  The baby will also have a UDS done, and a piece of umbillical cord will also be tested  I made them aware a social service consult has been placed and they will be seeing case management and children and youth  Patient and her mother verbalized understanding and are agreeable with plan

## 2023-03-03 LAB
ALBUMIN SERPL BCP-MCNC: 2.1 G/DL (ref 3.5–5)
ALBUMIN SERPL BCP-MCNC: 2.5 G/DL (ref 3.5–5)
ALP SERPL-CCNC: 134 U/L (ref 34–104)
ALP SERPL-CCNC: 151 U/L (ref 34–104)
ALT SERPL W P-5'-P-CCNC: 13 U/L (ref 7–52)
ALT SERPL W P-5'-P-CCNC: 9 U/L (ref 7–52)
ANION GAP SERPL CALCULATED.3IONS-SCNC: 4 MMOL/L (ref 4–13)
ANION GAP SERPL CALCULATED.3IONS-SCNC: 7 MMOL/L (ref 4–13)
AST SERPL W P-5'-P-CCNC: 15 U/L (ref 13–39)
AST SERPL W P-5'-P-CCNC: 22 U/L (ref 13–39)
BILIRUB SERPL-MCNC: 0.2 MG/DL (ref 0.2–1)
BILIRUB SERPL-MCNC: 0.52 MG/DL (ref 0.2–1)
BUN SERPL-MCNC: 11 MG/DL (ref 5–25)
BUN SERPL-MCNC: 13 MG/DL (ref 5–25)
C TRACH DNA SPEC QL NAA+PROBE: NEGATIVE
CALCIUM ALBUM COR SERPL-MCNC: 7.7 MG/DL (ref 8.3–10.1)
CALCIUM ALBUM COR SERPL-MCNC: 8 MG/DL (ref 8.3–10.1)
CALCIUM SERPL-MCNC: 6.5 MG/DL (ref 8.4–10.2)
CALCIUM SERPL-MCNC: 6.5 MG/DL (ref 8.4–10.2)
CHLORIDE SERPL-SCNC: 102 MMOL/L (ref 96–108)
CHLORIDE SERPL-SCNC: 104 MMOL/L (ref 96–108)
CO2 SERPL-SCNC: 25 MMOL/L (ref 21–32)
CO2 SERPL-SCNC: 25 MMOL/L (ref 21–32)
CREAT SERPL-MCNC: 0.61 MG/DL (ref 0.6–1.3)
CREAT SERPL-MCNC: 0.64 MG/DL (ref 0.6–1.3)
ERYTHROCYTE [DISTWIDTH] IN BLOOD BY AUTOMATED COUNT: 14.7 % (ref 11.6–15.1)
ERYTHROCYTE [DISTWIDTH] IN BLOOD BY AUTOMATED COUNT: 15 % (ref 11.6–15.1)
GFR SERPL CREATININE-BSD FRML MDRD: 122 ML/MIN/1.73SQ M
GFR SERPL CREATININE-BSD FRML MDRD: 124 ML/MIN/1.73SQ M
GLUCOSE SERPL-MCNC: 129 MG/DL (ref 65–140)
GLUCOSE SERPL-MCNC: 89 MG/DL (ref 65–140)
GP B STREP DNA SPEC QL NAA+PROBE: NEGATIVE
HCT VFR BLD AUTO: 21.3 % (ref 34.8–46.1)
HCT VFR BLD AUTO: 25.1 % (ref 34.8–46.1)
HGB BLD-MCNC: 6.4 G/DL (ref 11.5–15.4)
HGB BLD-MCNC: 7.9 G/DL (ref 11.5–15.4)
MCH RBC QN AUTO: 26.2 PG (ref 26.8–34.3)
MCH RBC QN AUTO: 27.1 PG (ref 26.8–34.3)
MCHC RBC AUTO-ENTMCNC: 30 G/DL (ref 31.4–37.4)
MCHC RBC AUTO-ENTMCNC: 31.5 G/DL (ref 31.4–37.4)
MCV RBC AUTO: 86 FL (ref 82–98)
MCV RBC AUTO: 87 FL (ref 82–98)
N GONORRHOEA DNA SPEC QL NAA+PROBE: NEGATIVE
PLATELET # BLD AUTO: 215 THOUSANDS/UL (ref 149–390)
PLATELET # BLD AUTO: 270 THOUSANDS/UL (ref 149–390)
PMV BLD AUTO: 11.6 FL (ref 8.9–12.7)
PMV BLD AUTO: 12.2 FL (ref 8.9–12.7)
POTASSIUM SERPL-SCNC: 4.5 MMOL/L (ref 3.5–5.3)
POTASSIUM SERPL-SCNC: 4.9 MMOL/L (ref 3.5–5.3)
PROT SERPL-MCNC: 4.6 G/DL (ref 6.4–8.4)
PROT SERPL-MCNC: 5.2 G/DL (ref 6.4–8.4)
RBC # BLD AUTO: 2.44 MILLION/UL (ref 3.81–5.12)
RBC # BLD AUTO: 2.92 MILLION/UL (ref 3.81–5.12)
SODIUM SERPL-SCNC: 133 MMOL/L (ref 135–147)
SODIUM SERPL-SCNC: 134 MMOL/L (ref 135–147)
WBC # BLD AUTO: 20.65 THOUSAND/UL (ref 4.31–10.16)
WBC # BLD AUTO: 20.76 THOUSAND/UL (ref 4.31–10.16)

## 2023-03-03 RX ADMIN — IBUPROFEN 600 MG: 600 TABLET, FILM COATED ORAL at 08:24

## 2023-03-03 RX ADMIN — DOCUSATE SODIUM 100 MG: 100 CAPSULE, LIQUID FILLED ORAL at 08:24

## 2023-03-03 RX ADMIN — MAGNESIUM SULFATE HEPTAHYDRATE 2 G/HR: 40 INJECTION, SOLUTION INTRAVENOUS at 02:12

## 2023-03-03 RX ADMIN — IBUPROFEN 600 MG: 600 TABLET, FILM COATED ORAL at 14:04

## 2023-03-03 RX ADMIN — IBUPROFEN 600 MG: 600 TABLET, FILM COATED ORAL at 01:10

## 2023-03-03 RX ADMIN — DOCUSATE SODIUM 100 MG: 100 CAPSULE, LIQUID FILLED ORAL at 18:05

## 2023-03-03 RX ADMIN — MAGNESIUM SULFATE HEPTAHYDRATE 2 G/HR: 40 INJECTION, SOLUTION INTRAVENOUS at 11:47

## 2023-03-03 NOTE — PROGRESS NOTES
Progress Note - OB/GYN  Ayde Sue 32 y o  female MRN: [id number]  Unit/Bed#: -01 Encounter: [id number]    Assessment and 238 [facility] is a patient of: [address] [place] [address]  She is PPD# 1 s/p  spontaneous vaginal delivery  Recovering well and is stable        (spontaneous vaginal delivery)  Assessment & Plan  S/p 1 dose BTM due to unknown GA  Lochia WNL   Recovering well   Appropriate bowel and bladder function   Pain well controlled   Tolerating diet   Encourage breastfeeding   Ambulating without issues   No lower extremity tenderness   Rh positive       Anemia  Assessment & Plan  Hgb on presentation 9 5 -> 10 6-> 7 7-> 6 4  plts 243  Transfuse 1U RBCs    Severe preeclampsia  Assessment & Plan  Patient dx with preeclampsia with severe, severe traits - blood pressures  Denies headache, vision changes, chest pain, shortness of breath, RUQ/epigastric pain   She has required 20/40mg  IV labetalol for acute antihypertensive treatment on 3/2 at 0550  Magnesium infusion until 1337 on 3/3  Systolic (53ORZ), PZW:742 , Min:107 , MGH:029     Diastolic (79OKO), AZE:41, Min:57, Max:106    PCr 4 36    Lab Results   Component Value Date/Time    HGB 6 4 (LL) 2023 04:46 AM    HGB 7 7 (L) 2023 09:17 PM     2023 04:46 AM     2023 09:17 PM    AST 15 2023 04:46 AM    AST 16 2023 09:17 PM    ALT 9 2023 04:46 AM    ALT 10 2023 09:17 PM    CREATININE 0 61 2023 04:46 AM    CREATININE 0 70 2023 09:17 PM       No prenatal care in current pregnancy  Assessment & Plan  Prenatal labs wnl, aside from anemia and proteinuria  GBS pending, received PCN for ppx  Positive amphetamines on UDS      Disposition    - Anticipate discharge home on PPD# 2-3      Subjective/Objective     Chief Complaint: Postpartum State     Subjective:    [address] [facility] is PPD#1 s/p  spontaneous vaginal delivery  She has no current complaints    Pain is well controlled  Patient is currently voiding  She is ambulating  Patient is currently passing flatus and has had no bowel movement  She is tolerating PO, and denies nausea or vomitting  Patient denies fever, chills, chest pain, shortness of breath, or calf tenderness  Lochia is normal  She is  Bottle feeding  She is recovering well and is stable  Vitals:   /63 (BP Location: Right arm)   Pulse 92   Temp 97 9 °F (36 6 °C) (Oral)   Resp 18   Ht 5' 5" (1 651 m)   Wt 86 2 kg (190 lb)   LMP  (Approximate)   SpO2 96%   Breastfeeding No   BMI 31 62 kg/m²       Intake/Output Summary (Last 24 hours) at 3/3/2023 9123  Last data filed at 3/3/2023 0201  Gross per 24 hour   Intake 2915 84 ml   Output 4132 ml   Net -1216 16 ml       Invasive Devices     Peripheral Intravenous Line  Duration           Peripheral IV 03/02/23 Left;Ventral (anterior) Wrist 1 day    Peripheral IV 03/02/23 Right Forearm <1 day                Physical Exam:   GEN: Donis Bolden appears well, alert and oriented x 3, pleasant and cooperative   CARDIO: RRR, no murmurs or rubs  RESP:  CTAB, no wheezes or rales  ABDOMEN: soft, no tenderness, no distention, fundus @ -1  EXTREMITIES: SCDs on, non tender, no erythema, b/l Jakub's sign negative      Labs:     Hemoglobin   Date Value Ref Range Status   03/03/2023 6 4 (LL) 11 5 - 15 4 g/dL Final     Comment: This is an appended report  These results have been appended to a previously preliminary verified report     03/02/2023 7 7 (L) 11 5 - 15 4 g/dL Final     WBC   Date Value Ref Range Status   03/03/2023 20 76 (H) 4 31 - 10 16 Thousand/uL Final   03/02/2023 28 86 (H) 4 31 - 10 16 Thousand/uL Final     Platelets   Date Value Ref Range Status   03/03/2023 215 149 - 390 Thousands/uL Final   03/02/2023 243 149 - 390 Thousands/uL Final     Creatinine   Date Value Ref Range Status   03/03/2023 0 61 0 60 - 1 30 mg/dL Final     Comment:     Standardized to IDMS reference method   03/02/2023 0  70 0 60 - 1 30 mg/dL Final     Comment:     Standardized to IDMS reference method     AST   Date Value Ref Range Status   03/03/2023 15 13 - 39 U/L Final     Comment:     Specimen collection should occur prior to Sulfasalazine administration due to the potential for falsely depressed results  03/02/2023 16 13 - 39 U/L Final     Comment:     Specimen collection should occur prior to Sulfasalazine administration due to the potential for falsely depressed results  ALT   Date Value Ref Range Status   03/03/2023 9 7 - 52 U/L Final     Comment:     Specimen collection should occur prior to Sulfasalazine administration due to the potential for falsely depressed results  03/02/2023 10 7 - 52 U/L Final     Comment:     Specimen collection should occur prior to Sulfasalazine administration due to the potential for falsely depressed results             Maru Vega MD  3/3/2023  6:55 AM

## 2023-03-03 NOTE — PLAN OF CARE
Problem: POSTPARTUM  Goal: Experiences normal postpartum course  Description: INTERVENTIONS:  - Monitor maternal vital signs  - Assess uterine involution and lochia  Outcome: Progressing  Goal: Appropriate maternal -  bonding  Description: INTERVENTIONS:  - Identify family support  - Assess for appropriate maternal/infant bonding   -Encourage maternal/infant bonding opportunities  - Referral to  or  as needed  Outcome: Progressing  Goal: Establishment of infant feeding pattern  Description: INTERVENTIONS:  - Assess breast/bottle feeding  - Refer to lactation as needed  Outcome: Progressing  Goal: Incision(s), wounds(s) or drain site(s) healing without S/S of infection  Description: INTERVENTIONS  Outcome: Progressing     Problem: PAIN - ADULT  Goal: Verbalizes/displays adequate comfort level or baseline comfort level  Description: Interventions:  - Encourage patient to monitor pain and request assistance  - Assess pain using appropriate pain scale  - Administer analgesics based on type and severity of pain and evaluate response  - Implement non-pharmacological measures as appropriate and evaluate response  - Consider cultural and social influences on pain and pain management  - Notify physician/advanced practitioner if interventions unsuccessful or patient reports new pain  Outcome: Progressing     Problem: INFECTION - ADULT  Goal: Absence or prevention of progression during hospitalization  Description: INTERVENTIONS:  - Assess and monitor for signs and symptoms of infection  - Monitor lab/diagnostic results  - Monitor all insertion sites, i e  indwelling lines, tubes, and drains  - Monitor endotracheal if appropriate and nasal secretions for changes in amount and color  - [place] appropriate cooling/warming therapies per order  - Administer medications as ordered  - Instruct and encourage patient and family to use good hand hygiene technique  - Identify and instruct in appropriate isolation precautions for identified infection/condition  Outcome: Progressing  Goal: Absence of fever/infection during neutropenic period  Description: INTERVENTIONS:  - Monitor WBC    Outcome: Progressing     Problem: SAFETY ADULT  Goal: Patient will remain free of falls  Description: INTERVENTIONS:  - Educate patient/family on patient safety including physical limitations  - Instruct patient to call for assistance with activity   - Consult OT/PT to assist with strengthening/mobility   - Keep Call bell within reach  - Keep bed low and locked with side rails adjusted as appropriate  - Keep care items and personal belongings within reach  - Initiate and maintain comfort rounds  - Apply yellow socks and bracelet for high fall risk patients  - Consider moving patient to room near nurses station  Outcome: Progressing  Goal: Maintain or return to baseline ADL function  Description: INTERVENTIONS:  -  Assess patient's ability to carry out ADLs; assess patient's baseline for ADL function and identify physical deficits which impact ability to perform ADLs (bathing, care of mouth/teeth, toileting, grooming, dressing, etc )  - Assess/evaluate cause of self-care deficits   - Assess range of motion  - Assess patient's mobility; develop plan if impaired  - Assess patient's need for assistive devices and provide as appropriate  - Encourage maximum independence but intervene and supervise when necessary  - Involve family in performance of ADLs  - Assess for home care needs following discharge   - Consider OT consult to assist with ADL evaluation and planning for discharge  - Provide patient education as appropriate  Outcome: Progressing  Goal: Maintains/Returns to pre admission functional level  Description: INTERVENTIONS:  - Perform BMAT or MOVE assessment daily    - Set and communicate daily mobility goal to care team and patient/family/caregiver     - Collaborate with rehabilitation services on mobility goals if consulted  - Out of bed for toileting  - Record patient progress and toleration of activity level   Outcome: Progressing     Problem: Knowledge Deficit  Goal: Patient/family/caregiver demonstrates understanding of disease process, treatment plan, medications, and discharge instructions  Description: Complete learning assessment and assess knowledge base    Interventions:  - Provide teaching at level of understanding  - Provide teaching via preferred learning methods  Outcome: Progressing     Problem: DISCHARGE PLANNING  Goal: Discharge to home or other facility with appropriate resources  Description: INTERVENTIONS:  - Identify barriers to discharge w/patient and caregiver  - Arrange for needed discharge resources and transportation as appropriate  - Identify discharge learning needs (meds, wound care, etc )  - Arrange for interpretive services to assist at discharge as needed  - Refer to Case Management Department for coordinating discharge planning if the patient needs post-hospital services based on physician/advanced practitioner order or complex needs related to functional status, cognitive ability, or social support system  Outcome: Progressing

## 2023-03-03 NOTE — UTILIZATION REVIEW
NOTIFICATION OF INPATIENT ADMISSION   MATERNITY/DELIVERY AUTHORIZATION REQUEST   SERVICING FACILITY:   [facility] - L&D, , NICU  [address] [place]  [address]  Tax ID: [id number]  NPI: [id number]   ATTENDING PROVIDER:  Attending Name and NPI#: Ronny Carpio Md [[id number]]  Address: [address] [place] [address]  [address]  Phone: [phone]   ADMISSION INFORMATION:  Place of Service: Inpatient [address] [facility]  [address]  Place of Service Code: 21  Inpatient Admission Date/Time: 3/2/23  4:39 AM  Discharge Date/Time: No discharge date for patient encounter  Admitting Diagnosis Code/Description:  Pregnancy [Z34 90]  36 weeks gestation of pregnancy [Z3A 36]  Uterine contractions [O47 9]  Encounter for full-term uncomplicated delivery [M74]     Mother: Janett Singer 1995 Estimated Date of Delivery: 3/25/23  Delivering clinician: Ronny Carpio    OB History        1    Para   1    Term           1    AB        Living   1       SAB        IAB        Ectopic        Multiple   0    Live Births   1               Wing Name & MRN:   Information for the patient's :  Angus Becker Girl Foster Delgado) [[id number]]     Wing Delivery Information:  Sex: female  Delivered 3/2/2023 4:39 PM by Vaginal, Spontaneous; Gestational Age: 44w9d    Wing Measurements:  Weight: 6 lb 2 6 oz (2795 g); Height: 19"    APGAR 1 minute 5 minutes 10 minutes   Totals: 8 9      Wing Birth Information: 32 y o  female MRN: [id number] Unit/Bed#: -01   Birthweight: No birth weight on file  Gestational Age: <None> Delivery Type:    APGARS Totals:        UTILIZATION REVIEW CONTACT:  Jania Kebede, Utilization   Network Utilization Review Department  Phone: [phone]  Fax [phone]  Email: [email or web address]  Contact for approvals/pending authorizations, clinical reviews, and discharge       PHYSICIAN ADVISORY SERVICES:  Medical Necessity Denial & [employer] Review  Phone: [phone]  Fax: [phone]  Email: [email or web address]

## 2023-03-03 NOTE — PLAN OF CARE
Problem: POSTPARTUM  Goal: Experiences normal postpartum course  Description: INTERVENTIONS:  - Monitor maternal vital signs  - Assess uterine involution and lochia  Outcome: Progressing  Goal: Appropriate maternal -  bonding  Description: INTERVENTIONS:  - Identify family support  - Assess for appropriate maternal/infant bonding   -Encourage maternal/infant bonding opportunities  - Referral to  or  as needed  Outcome: Progressing  Goal: Establishment of infant feeding pattern  Description: INTERVENTIONS:  - Assess breast/bottle feeding  - Refer to lactation as needed  Outcome: Progressing  Goal: Incision(s), wounds(s) or drain site(s) healing without S/S of infection  Description: INTERVENTIONS  - Assess and document dressing, incision, wound bed, drain sites and surrounding tissue  - Provide patient and family education  - Perform skin care/dressing changes every   Outcome: Progressing     Problem: PAIN - ADULT  Goal: Verbalizes/displays adequate comfort level or baseline comfort level  Description: Interventions:  - Encourage patient to monitor pain and request assistance  - Assess pain using appropriate pain scale  - Administer analgesics based on type and severity of pain and evaluate response  - Implement non-pharmacological measures as appropriate and evaluate response  - Consider cultural and social influences on pain and pain management  - Notify physician/advanced practitioner if interventions unsuccessful or patient reports new pain  Outcome: Progressing     Problem: INFECTION - ADULT  Goal: Absence or prevention of progression during hospitalization  Description: INTERVENTIONS:  - Assess and monitor for signs and symptoms of infection  - Monitor lab/diagnostic results  - Monitor all insertion sites, i e  indwelling lines, tubes, and drains  - Monitor endotracheal if appropriate and nasal secretions for changes in amount and color  - [place] appropriate cooling/warming therapies per order  - Administer medications as ordered  - Instruct and encourage patient and family to use good hand hygiene technique  - Identify and instruct in appropriate isolation precautions for identified infection/condition  Outcome: Progressing  Goal: Absence of fever/infection during neutropenic period  Description: INTERVENTIONS:  - Monitor WBC    Outcome: Progressing     Problem: SAFETY ADULT  Goal: Patient will remain free of falls  Description: INTERVENTIONS:  - Educate patient/family on patient safety including physical limitations  - Instruct patient to call for assistance with activity   - Consult OT/PT to assist with strengthening/mobility   - Keep Call bell within reach  - Keep bed low and locked with side rails adjusted as appropriate  - Keep care items and personal belongings within reach  - Initiate and maintain comfort rounds  - Make Fall Risk Sign visible to staff  - Offer Toileting every  Hours, in advance of need  - Initiate/Maintain alarm  - Obtain necessary fall risk management equipment:   - Apply yellow socks and bracelet for high fall risk patients  - Consider moving patient to room near nurses station  Outcome: Progressing  Goal: Maintain or return to baseline ADL function  Description: INTERVENTIONS:  -  Assess patient's ability to carry out ADLs; assess patient's baseline for ADL function and identify physical deficits which impact ability to perform ADLs (bathing, care of mouth/teeth, toileting, grooming, dressing, etc )  - Assess/evaluate cause of self-care deficits   - Assess range of motion  - Assess patient's mobility; develop plan if impaired  - Assess patient's need for assistive devices and provide as appropriate  - Encourage maximum independence but intervene and supervise when necessary  - Involve family in performance of ADLs  - Assess for home care needs following discharge   - Consider OT consult to assist with ADL evaluation and planning for discharge  - Provide patient education as appropriate  Outcome: Progressing  Goal: Maintains/Returns to pre admission functional level  Description: INTERVENTIONS:  - Perform BMAT or MOVE assessment daily    - Set and communicate daily mobility goal to care team and patient/family/caregiver  - Collaborate with rehabilitation services on mobility goals if consulted  - Perform Range of Motion times a day  - Reposition patient every  hours  - Dangle patient  times a day  - Stand patient  times a day  - Ambulate patient  times a day  - Out of bed to chair  times a day   - Out of bed for meals  times a day  - Out of bed for toileting  - Record patient progress and toleration of activity level   Outcome: Progressing     Problem: Knowledge Deficit  Goal: Patient/family/caregiver demonstrates understanding of disease process, treatment plan, medications, and discharge instructions  Description: Complete learning assessment and assess knowledge base    Interventions:  - Provide teaching at level of understanding  - Provide teaching via preferred learning methods  Outcome: Progressing     Problem: DISCHARGE PLANNING  Goal: Discharge to home or other facility with appropriate resources  Description: INTERVENTIONS:  - Identify barriers to discharge w/patient and caregiver  - Arrange for needed discharge resources and transportation as appropriate  - Identify discharge learning needs (meds, wound care, etc )  - Arrange for interpretive services to assist at discharge as needed  - Refer to Case Management Department for coordinating discharge planning if the patient needs post-hospital services based on physician/advanced practitioner order or complex needs related to functional status, cognitive ability, or social support system  Outcome: Progressing

## 2023-03-03 NOTE — CASE MANAGEMENT
Case Management Progress Note    Patient name Lucero Salgado  Location /-58 MRN [id number]  : 1995 Date 3/3/2023       LOS (days): 1  Geometric Mean LOS (GMLOS) (days):   Days to GMLOS:        OBJECTIVE:        Current admission status: Inpatient  Preferred Pharmacy:   [facility] [address] [address] [place], [address],  [address] [address]  Phone: [phone] Fax: [phone]    Primary Care Provider: No primary care provider on file  Primary Insurance: [address]  Secondary Insurance:     PROGRESS NOTE:    CM met with MOB to introduce CM services, complete assessment, and provide CM contact info  MOB had  Family Member Mother present and verbalized agreement with personal interview with them present  MOB reported the following:    Assessment:  • Consult reason: MH/Drug/ETOH "No prenatal care  Denies Drug use  UDS positive for amph/meth"  • Gestational Age at Birth: 44+5  • Name/Age MOB:   [address] [place] [address]   • Name/Age/Contact FOB:   NOT NAMED  • Other Legal Guardian(s) for Baby:  N/A   • Parents other children and ages: N/A    • Housing Plan/Lives with:  MOB lives with her parents   • Insurance Coverage/Plan for Baby: MOB verbalizes that they will contact [facility] welfare office and apply baby for medical assistance ASAP  • Support System: Family  • Care Items: Car Seat  • Crib/Bassinet (Safe Sleep Space)  • Diapers  • Wipes  • Clothing  • Method of Feeding: Exclusively Bottle Feeding w/ formula  MOB verbalizes desire to breast feed  She states she was told she could not breastfeed currently d/t + UDS - CM reached out to Lactation Consult to review  Lactation consultant met with MOB  • Breast Pump: CM received consult for Spectra S2 for home use   Order placed to [employer] via Michelle Valdez, pending approval   • Government Assistance Programs: [employer] (Special Supplemental Nutrition Program for Women, Infants, and Children)  MOB has form she was sent from [facility] that required hospital doctor to complete before they will schedule her  CM s/w Nursery Nurse Teresa John who will leave not for pediatrician to f/u in the AM   •  Arrangements: MOB and Family  • Current Employment/Schooling: MOB employed Part Time  • Mental Health History and/or Treatment:     • Substance Use History and/or Treatment:     • Urine Drug Screen Results: Positive MOB UDS + Meth/amphetamines - Denies drug use, does not know how that would happen  • Children & Youth History: None  • Current Legal Issues: N/A  • Domestic/ Intimate Partner Violence History: Denies  • NICU Resources: N/A    Discharge Plan:  • Pediatrician:   TONY  • Prenatal/ Care:  NONE - CM reviewed concerns regarding lack of prenatal care  MOB states she was in denial that she might be pregnant for a while and once she confirmed her pregnancy she was scare to tell her mom/family  Once she told her mom they tried to frind a provider but nobody would see he because she was too far along or they didn't accept her insurance  She also states she had difficulty obtaining insurance and has several letter both approving and denying her coverage  • Follow-Up Appointments Needed/Scheduled:   Yes MOB - OBGYN, BABY - PEDS  • Medications/DME/Other Referrals:   • Transportation Plan: MOB has a vehicle and Family has a vehicle    Follow-Up Needed from Care Management:        CM review + UDS with mom and notified her of need to report to [address] [place] [address]  MOB verbalizes understanding  [facility] and s/w Kev Ramirez (Screener # [id number]) to make report  Kev Ramirez states that report will be sent to [facility] office for review  CM dept can follow up with report at [phone]  No referral number until report is processed but will be listed under MOBs name  AWAITING CLEARANCE FROM [facility] PRIOR TO BABY D/C

## 2023-03-03 NOTE — ASSESSMENT & PLAN NOTE
Pain well controlled  Voiding spontaneously  Appropriate bowel function  Tolerating diet   Breastfeeding  Lochia wnl  · Continue routine postpartum care

## 2023-03-03 NOTE — LACTATION NOTE
This note was copied from a baby's chart  RSB/DC booklets provided  Set Mom up with a breast pump at this time  Education provided on settings and storage  Discussed importance of breast stimulation for establishing a milk supply  At this time the recommendation is to pump and discard milk until UDS is negative, or 2-4 days since last use  Discussed with Mom risk to baby if actively using drugs while breastfeeding, that these substances can pass through her milk to the

## 2023-03-04 VITALS
RESPIRATION RATE: 18 BRPM | BODY MASS INDEX: 31.65 KG/M2 | HEIGHT: 65 IN | SYSTOLIC BLOOD PRESSURE: 120 MMHG | DIASTOLIC BLOOD PRESSURE: 67 MMHG | TEMPERATURE: 98.7 F | OXYGEN SATURATION: 95 % | HEART RATE: 85 BPM | WEIGHT: 190 LBS

## 2023-03-04 LAB
ABO GROUP BLD BPU: NORMAL
ALBUMIN SERPL BCP-MCNC: 2.4 G/DL (ref 3.5–5)
ALBUMIN SERPL BCP-MCNC: 2.4 G/DL (ref 3.5–5)
ALP SERPL-CCNC: 128 U/L (ref 34–104)
ALP SERPL-CCNC: 136 U/L (ref 34–104)
ALT SERPL W P-5'-P-CCNC: 12 U/L (ref 7–52)
ALT SERPL W P-5'-P-CCNC: 12 U/L (ref 7–52)
AMPHETAMINES SERPL QL SCN: NEGATIVE
ANION GAP SERPL CALCULATED.3IONS-SCNC: 5 MMOL/L (ref 4–13)
ANION GAP SERPL CALCULATED.3IONS-SCNC: 5 MMOL/L (ref 4–13)
AST SERPL W P-5'-P-CCNC: 20 U/L (ref 13–39)
AST SERPL W P-5'-P-CCNC: 21 U/L (ref 13–39)
BARBITURATES UR QL: NEGATIVE
BENZODIAZ UR QL: NEGATIVE
BILIRUB SERPL-MCNC: 0.27 MG/DL (ref 0.2–1)
BILIRUB SERPL-MCNC: 0.3 MG/DL (ref 0.2–1)
BPU ID: NORMAL
BUN SERPL-MCNC: 13 MG/DL (ref 5–25)
BUN SERPL-MCNC: 14 MG/DL (ref 5–25)
CALCIUM ALBUM COR SERPL-MCNC: 7.7 MG/DL (ref 8.3–10.1)
CALCIUM ALBUM COR SERPL-MCNC: 7.8 MG/DL (ref 8.3–10.1)
CALCIUM SERPL-MCNC: 6.4 MG/DL (ref 8.4–10.2)
CALCIUM SERPL-MCNC: 6.5 MG/DL (ref 8.4–10.2)
CHLORIDE SERPL-SCNC: 106 MMOL/L (ref 96–108)
CHLORIDE SERPL-SCNC: 106 MMOL/L (ref 96–108)
CO2 SERPL-SCNC: 25 MMOL/L (ref 21–32)
CO2 SERPL-SCNC: 25 MMOL/L (ref 21–32)
COCAINE UR QL: NEGATIVE
CREAT SERPL-MCNC: 0.58 MG/DL (ref 0.6–1.3)
CREAT SERPL-MCNC: 0.63 MG/DL (ref 0.6–1.3)
CROSSMATCH: NORMAL
ERYTHROCYTE [DISTWIDTH] IN BLOOD BY AUTOMATED COUNT: 15.6 % (ref 11.6–15.1)
ERYTHROCYTE [DISTWIDTH] IN BLOOD BY AUTOMATED COUNT: 15.6 % (ref 11.6–15.1)
GFR SERPL CREATININE-BSD FRML MDRD: 123 ML/MIN/1.73SQ M
GFR SERPL CREATININE-BSD FRML MDRD: 126 ML/MIN/1.73SQ M
GLUCOSE SERPL-MCNC: 84 MG/DL (ref 65–140)
GLUCOSE SERPL-MCNC: 90 MG/DL (ref 65–140)
HCT VFR BLD AUTO: 22.6 % (ref 34.8–46.1)
HCT VFR BLD AUTO: 22.9 % (ref 34.8–46.1)
HGB BLD-MCNC: 7 G/DL (ref 11.5–15.4)
HGB BLD-MCNC: 7.3 G/DL (ref 11.5–15.4)
MCH RBC QN AUTO: 27.2 PG (ref 26.8–34.3)
MCH RBC QN AUTO: 27.5 PG (ref 26.8–34.3)
MCHC RBC AUTO-ENTMCNC: 31 G/DL (ref 31.4–37.4)
MCHC RBC AUTO-ENTMCNC: 31.9 G/DL (ref 31.4–37.4)
MCV RBC AUTO: 86 FL (ref 82–98)
MCV RBC AUTO: 88 FL (ref 82–98)
METHADONE UR QL: NEGATIVE
OPIATES UR QL SCN: NEGATIVE
OXYCODONE+OXYMORPHONE UR QL SCN: NEGATIVE
PCP UR QL: NEGATIVE
PLATELET # BLD AUTO: 253 THOUSANDS/UL (ref 149–390)
PLATELET # BLD AUTO: 259 THOUSANDS/UL (ref 149–390)
PMV BLD AUTO: 11.6 FL (ref 8.9–12.7)
PMV BLD AUTO: 11.8 FL (ref 8.9–12.7)
POTASSIUM SERPL-SCNC: 4.7 MMOL/L (ref 3.5–5.3)
POTASSIUM SERPL-SCNC: 4.9 MMOL/L (ref 3.5–5.3)
PROT SERPL-MCNC: 5 G/DL (ref 6.4–8.4)
PROT SERPL-MCNC: 5.1 G/DL (ref 6.4–8.4)
RBC # BLD AUTO: 2.57 MILLION/UL (ref 3.81–5.12)
RBC # BLD AUTO: 2.65 MILLION/UL (ref 3.81–5.12)
SODIUM SERPL-SCNC: 136 MMOL/L (ref 135–147)
SODIUM SERPL-SCNC: 136 MMOL/L (ref 135–147)
THC UR QL: NEGATIVE
UNIT DISPENSE STATUS: NORMAL
UNIT PRODUCT CODE: NORMAL
UNIT PRODUCT VOLUME: 350 ML
UNIT RH: NORMAL
WBC # BLD AUTO: 17 THOUSAND/UL (ref 4.31–10.16)
WBC # BLD AUTO: 17.54 THOUSAND/UL (ref 4.31–10.16)

## 2023-03-04 RX ORDER — DOCUSATE SODIUM 100 MG/1
100 CAPSULE, LIQUID FILLED ORAL 2 TIMES DAILY PRN
Refills: 0
Start: 2023-03-04

## 2023-03-04 RX ORDER — IBUPROFEN 600 MG/1
600 TABLET ORAL EVERY 6 HOURS PRN
Qty: 30 TABLET | Refills: 0
Start: 2023-03-04

## 2023-03-04 RX ORDER — ACETAMINOPHEN AND CODEINE PHOSPHATE 120; 12 MG/5ML; MG/5ML
1 SOLUTION ORAL DAILY
Qty: 28 TABLET | Refills: 4 | Status: SHIPPED | OUTPATIENT
Start: 2023-03-04

## 2023-03-04 RX ORDER — FERROUS SULFATE TAB EC 324 MG (65 MG FE EQUIVALENT) 324 (65 FE) MG
324 TABLET DELAYED RESPONSE ORAL
Qty: 30 TABLET | Refills: 2 | Status: SHIPPED | OUTPATIENT
Start: 2023-03-04

## 2023-03-04 RX ORDER — ACETAMINOPHEN 325 MG/1
650 TABLET ORAL EVERY 6 HOURS PRN
Refills: 0
Start: 2023-03-04

## 2023-03-04 RX ADMIN — IBUPROFEN 600 MG: 600 TABLET, FILM COATED ORAL at 07:57

## 2023-03-04 RX ADMIN — IBUPROFEN 600 MG: 600 TABLET, FILM COATED ORAL at 15:03

## 2023-03-04 RX ADMIN — DOCUSATE SODIUM 100 MG: 100 CAPSULE, LIQUID FILLED ORAL at 07:56

## 2023-03-04 RX ADMIN — IRON SUCROSE 200 MG: 20 INJECTION, SOLUTION INTRAVENOUS at 09:33

## 2023-03-04 NOTE — PLAN OF CARE

## 2023-03-04 NOTE — LACTATION NOTE
This note was copied from a baby's chart  CONSULT - LACTATION  Baby Girl [facility]) [address] 2 days female MRN: [id number]    [address] Room / Bed:  304(N)/ 304(N) Encounter: [id number]    Maternal Information     MOTHER:  Yoana Sue  Maternal Age: 32 y o    OB History: # 1 - Date: 23, Sex: Female, Weight: 2795 g (6 lb 2 6 oz), GA: 36w5d, Delivery: Vaginal, Spontaneous, Apgar1: 8, Apgar5: 9, Living: Living, Birth Comments: None   Previouse breast reduction surgery? No    Lactation history:   Has patient previously breast fed: No   How long had patient previously breast fed:     Previous breast feeding complications:       Past Surgical History:   Procedure Laterality Date   • TYMPANOSTOMY Bilateral     age 35 years old        Birth information:  YOB: 2023   Time of birth: 4:39 PM   Sex: female   Delivery type: Vaginal, Spontaneous   Birth Weight: 2795 g (6 lb 2 6 oz)   Percent of Weight Change: -7%     Gestational Age: 44w9d   [unfilled]    Assessment     Breast and nipple assessment: normal assessment    [place] Assessment: normal assessment    Feeding assessment: First attempt at the breast this morning  LATCH:  Latch: Repeated attempts, hold nipple in mouth, stimulate to suck   Audible Swallowing: None   Type of Nipple: Everted (After stimulation)   Comfort (Breast/Nipple): Soft/non-tender   Hold (Positioning): Partial assist, teach one side, mother does other, staff holds   [facility] Score: 6          Feeding recommendations:    1) Introduce breast first for feeding  2) Feed infant expressed breast milk after breast feeding/attempt  3) Feed infant formula as ordered/needed (you may do step 2 & 3 with paced bottle feeding)  4) Pump after every feeding until Milk Supply/ Breastfeeding is established  Met with Regis Moya, who's plan is to breastfeed her baby girl   Her UDS is negative and she is able to place baby to the breast and feed her baby her expressed breast milk  ( Did clarify this with Dr Madhavi Cobos )  Reviewed Positioning and Latch with Mom:   - Position baby up at chest level using pillows for support    - Bring baby to breast,not breast to baby ( no hunching over )   - Align nose to nipple and drag nipple down to chin to achieve a wide open mouth and a deep latch   - Baby's ear, shoulder, hip in alignment   - Baby's upper and lower lip should be flanged on the breast     Latch assessment was done  Aramis  was able to position her baby at the breast with support, baby is sleepy and no latch was achieved  Aramis  states that baby had just been formula fed about 1 hour prior to attempt  Also reviewed the Ready Set Baby and the Discharge Breastfeeding Booklets as well as the Breastfeeding an LPI baby information with her  Information given and discussed on breastfeeding a late  infant  Discussed sleepiness, maintaining body temperature, the lack of stamina necessitating shorter feedings  Discussed Skin to Skin contact and benefits to mom and baby  Feeding cues and what that means for recognizing infant's hunger reviewed  Avoidance of pacifiers for the first month discussed  Positioning and latch reviewed as well as showing images of other feeding positions  Discussed the properties of a good latch in any position  Reviewed hand/manual expression  Gave information on common concerns, what to expect the first few weeks after delivery, preparing for other caregivers, and how partners can help  Resources for support also provided  Also went over the discharge breastfeeding booklet including the feeding log  Emphasized 8 or more (12) feedings in a 24 hour period and what to expect for the number of diapers per day of life  Discussed s/s engorgement, blocked milk ducts, and mastitis  Discussed how to remedy at home and when to contact physician      Breastfeeding and your lifestyle, storage and preparation of breast milk, how to keep you breast pump clean, the employed breastfeeding mother and paced bottle feeding handouts provided  Beulah Vincent has not started pumping yet, stressed importance of pumping every 2-3 hours as baby feeds to establish/protect her milk supply  She states that she will begin breastfeeding at home today  Booklet included Breastfeeding Resources for after discharge including access to the number for the [address] for follow up breastfeeding support as needed  She is also eligible for [facility] and will call to schedule an appointment on Monday morning            Felix Lugo RN 3/4/2023 1:57 PM

## 2023-03-04 NOTE — PROGRESS NOTES
Progress Note - OB/GYN  Roxanna Sue 32 y o  female MRN: [id number]  Unit/Bed#: -01 Encounter: [id number]    Assessment and Plan:  Lucero Salgado is a patient of: [address] [place] [address]  She is PPD# 2 s/p  spontaneous vaginal delivery complicated by postpartum anemia requiring blood transfusion  Now recovering well and is stable  By issue:       (spontaneous vaginal delivery)  Assessment & Plan  Pain well controlled  Voiding spontaneously  Appropriate bowel function  Tolerating diet   Breastfeeding  Lochia wnl  · Continue routine postpartum care    Anemia  Assessment & Plan  Hgb 9 5 --> 10 6 --> 7 7 --> 6 4 --> 1U pBRCs --> 7 9 --> 7 0 (today)  Plts 243  Asymptomatic  VSS  · Will give Venofer today  · Discharge on oral iron    Severe preeclampsia  Assessment & Plan  Patient dx with preeclampsia with severe features (blood pressures)  Denies headache, vision changes, chest pain, shortness of breath, RUQ/epigastric pain  S/p labetalol 20/40 mg IV 3/2/23  S/p 24 hr magnesium for seizure prophylaxis  Asymptomatic  Systolic (20LWB), SIF:990 , Min:121 , TTF:519     Diastolic (44YRU), IUM:98, Min:73, Max:87    Urine P:Cr 4 36  Coags & fibrinogen wnl    Lab Results   Component Value Date/Time    HGB 7 0 (L) 2023 04:35 AM    HGB 7 3 (L) 2023 12:48 AM     2023 04:35 AM     2023 12:48 AM    AST 20 2023 04:35 AM    AST 21 2023 12:48 AM    ALT 12 2023 04:35 AM    ALT 12 2023 12:48 AM    CREATININE 0 58 (L) 2023 04:35 AM    CREATININE 0 63 2023 12:48 AM     · Monitor BP  · Monitor for signs/symptoms of HTN/PreE    No prenatal care in current pregnancy  Assessment & Plan  Prenatal labs wnl, aside from anemia and proteinuria  GBS pending, received PCN for ppx  Positive amphetamines on UDS  Case management saw her, cleared per patient though awaiting confirmation from CM directly        Disposition    - Anticipate discharge home on PPD# 2      Subjective/Objective     Chief Complaint: Postpartum State     Subjective:    Garrett Aguilar is PPD#1 s/p spontaneous vaginal delivery  She has no current complaints  Pain is well controlled  Patient is currently voiding  She is ambulating  Patient is currently passing flatus and has had no bowel movement  She is tolerating PO, and denies nausea or vomitting  Patient denies fever, chills, chest pain, shortness of breath, or calf tenderness  Lochia is normal  She is  Breastfeeding  She is recovering well and is stable         Vitals:   /79 (BP Location: Left arm)   Pulse 83   Temp 98 3 °F (36 8 °C) (Temporal)   Resp 20   Ht 5' 5" (1 651 m)   Wt 86 2 kg (190 lb)   LMP  (Approximate)   SpO2 95%   Breastfeeding No   BMI 31 62 kg/m²       Intake/Output Summary (Last 24 hours) at 3/4/2023 0741  Last data filed at 3/3/2023 1618  Gross per 24 hour   Intake 870 ml   Output 2100 ml   Net -1230 ml       Invasive Devices     Peripheral Intravenous Line  Duration           Peripheral IV 03/02/23 Left;Ventral (anterior) Wrist 2 days    Peripheral IV 03/02/23 Right Forearm 1 day                Physical Exam:   GEN: Garrett Aguilar appears well, alert and oriented x 3, pleasant and cooperative   CARDIO: warm and well-perfused  RESP: non-labored breathing  ABDOMEN: soft, no tenderness, no distention, fundus @ U -2  EXTREMITIES: SCDs on, non tender, no erythema, b/l Jakub's sign negative      Labs:     Hemoglobin   Date Value Ref Range Status   03/04/2023 7 0 (L) 11 5 - 15 4 g/dL Final   03/04/2023 7 3 (L) 11 5 - 15 4 g/dL Final     WBC   Date Value Ref Range Status   03/04/2023 17 00 (H) 4 31 - 10 16 Thousand/uL Final   03/04/2023 17 54 (H) 4 31 - 10 16 Thousand/uL Final     Platelets   Date Value Ref Range Status   03/04/2023 259 149 - 390 Thousands/uL Final   03/04/2023 253 149 - 390 Thousands/uL Final     Creatinine   Date Value Ref Range Status   03/04/2023 0 58 (L) 0 60 - 1 30 mg/dL Final     Comment:     Standardized to IDMS reference method   03/04/2023 0 63 0 60 - 1 30 mg/dL Final     Comment:     Standardized to IDMS reference method     AST   Date Value Ref Range Status   03/04/2023 20 13 - 39 U/L Final     Comment:     Specimen collection should occur prior to Sulfasalazine administration due to the potential for falsely depressed results  03/04/2023 21 13 - 39 U/L Final     Comment:     Specimen collection should occur prior to Sulfasalazine administration due to the potential for falsely depressed results  ALT   Date Value Ref Range Status   03/04/2023 12 7 - 52 U/L Final     Comment:     Specimen collection should occur prior to Sulfasalazine administration due to the potential for falsely depressed results  03/04/2023 12 7 - 52 U/L Final     Comment:     Specimen collection should occur prior to Sulfasalazine administration due to the potential for falsely depressed results             Yvette Beltre MD  3/4/2023  7:41 AM

## 2023-03-04 NOTE — PLAN OF CARE
Problem: POSTPARTUM  Goal: Experiences normal postpartum course  Description: INTERVENTIONS:  - Monitor maternal vital signs  - Assess uterine involution and lochia  3/4/2023 1532 by Chago Ryan  Outcome: Completed  3/4/2023 0952 by Chago Ryan  Outcome: Adequate for Discharge  Goal: Appropriate maternal -  bonding  Description: INTERVENTIONS:  - Identify family support  - Assess for appropriate maternal/infant bonding   -Encourage maternal/infant bonding opportunities  - Referral to  or  as needed  3/4/2023 1532 by Chago Ryan  Outcome: Completed  3/4/2023 0952 by Chago Ryan  Outcome: Adequate for Discharge  Goal: Establishment of infant feeding pattern  Description: INTERVENTIONS:  - Assess breast/bottle feeding  - Refer to lactation as needed  3/4/2023 1532 by Chago Ryan  Outcome: Completed  3/4/2023 0952 by Chago Ryan  Outcome: Adequate for Discharge  Goal: Incision(s), wounds(s) or drain site(s) healing without S/S of infection  Description: INTERVENTIONS  - Assess and document dressing, incision, wound bed, drain sites and surrounding tissue  - Provide patient and family education  3/4/2023 1532 by Chago Ryan  Outcome: Completed  3/4/2023 0952 by Chago Ryan  Outcome: Adequate for Discharge     Problem: PAIN - ADULT  Goal: Verbalizes/displays adequate comfort level or baseline comfort level  Description: Interventions:  - Encourage patient to monitor pain and request assistance  - Assess pain using appropriate pain scale  - Administer analgesics based on type and severity of pain and evaluate response  - Implement non-pharmacological measures as appropriate and evaluate response  - Consider cultural and social influences on pain and pain management  - Notify physician/advanced practitioner if interventions unsuccessful or patient reports new pain  3/4/2023 1532 by Chago Ryan  Outcome: Completed  3/4/2023 0952 by Devika Rolle Minh  Outcome: Adequate for Discharge     Problem: INFECTION - ADULT  Goal: Absence or prevention of progression during hospitalization  Description: INTERVENTIONS:  - Assess and monitor for signs and symptoms of infection  - Monitor lab/diagnostic results  - Monitor all insertion sites, i e  indwelling lines, tubes, and drains  - Monitor endotracheal if appropriate and nasal secretions for changes in amount and color  - [place] appropriate cooling/warming therapies per order  - Administer medications as ordered  - Instruct and encourage patient and family to use good hand hygiene technique  - Identify and instruct in appropriate isolation precautions for identified infection/condition  3/4/2023 1532 by Araseli Leonardo  Outcome: Completed  3/4/2023 0952 by Araseli Leonardo  Outcome: Adequate for Discharge  Goal: Absence of fever/infection during neutropenic period  Description: INTERVENTIONS:  - Monitor WBC    3/4/2023 1532 by Araseli Leonardo  Outcome: Completed  3/4/2023 0952 by Araseli Leonardo  Outcome: Adequate for Discharge     Problem: SAFETY ADULT  Goal: Patient will remain free of falls  Description: INTERVENTIONS:  - Educate patient/family on patient safety including physical limitations  - Instruct patient to call for assistance with activity   - Consult OT/PT to assist with strengthening/mobility   - Keep Call bell within reach  - Keep bed low and locked with side rails adjusted as appropriate  - Keep care items and personal belongings within reach  - Initiate and maintain comfort rounds  - Make Fall Risk Sign visible to staff  - Apply yellow socks and bracelet for high fall risk patients  - Consider moving patient to room near nurses station  3/4/2023 1532 by Araseli Leonardo  Outcome: Completed  3/4/2023 0952 by Araseli Leonardo  Outcome: Adequate for Discharge  Goal: Maintain or return to baseline ADL function  Description: INTERVENTIONS:  -  Assess patient's ability to carry out ADLs; assess patient's baseline for ADL function and identify physical deficits which impact ability to perform ADLs (bathing, care of mouth/teeth, toileting, grooming, dressing, etc )  - Assess/evaluate cause of self-care deficits   - Assess range of motion  - Assess patient's mobility; develop plan if impaired  - Assess patient's need for assistive devices and provide as appropriate  - Encourage maximum independence but intervene and supervise when necessary  - Involve family in performance of ADLs  - Assess for home care needs following discharge   - Consider OT consult to assist with ADL evaluation and planning for discharge  - Provide patient education as appropriate  3/4/2023 1532 by Tone Baumann  Outcome: Completed  3/4/2023 0952 by Tone Baumann  Outcome: Adequate for Discharge  Goal: Maintains/Returns to pre admission functional level  Description: INTERVENTIONS:  - Perform BMAT or MOVE assessment daily    - Set and communicate daily mobility goal to care team and patient/family/caregiver  - Collaborate with rehabilitation services on mobility goals if consulted  - Out of bed for toileting  - Record patient progress and toleration of activity level   3/4/2023 1532 by Tone Baumann  Outcome: Completed  3/4/2023 0952 by Tone Baumann  Outcome: Adequate for Discharge     Problem: Knowledge Deficit  Goal: Patient/family/caregiver demonstrates understanding of disease process, treatment plan, medications, and discharge instructions  Description: Complete learning assessment and assess knowledge base    Interventions:  - Provide teaching at level of understanding  - Provide teaching via preferred learning methods  3/4/2023 1532 by Tone Baumann  Outcome: Completed  3/4/2023 0952 by Tone Baumann  Outcome: Adequate for Discharge     Problem: DISCHARGE PLANNING  Goal: Discharge to home or other facility with appropriate resources  Description: INTERVENTIONS:  - Identify barriers to discharge w/patient and caregiver  - Arrange for needed discharge resources and transportation as appropriate  - Identify discharge learning needs (meds, wound care, etc )  - Arrange for interpretive services to assist at discharge as needed  - Refer to Case Management Department for coordinating discharge planning if the patient needs post-hospital services based on physician/advanced practitioner order or complex needs related to functional status, cognitive ability, or social support system  3/4/2023 1532 by Ray Schmidt  Outcome: Completed  3/4/2023 0952 by Ray Schmidt  Outcome: Adequate for Discharge

## 2023-03-04 NOTE — CASE MANAGEMENT
Case Management Progress Note    Patient name Hola Gramajo  Location /-73 MRN [id number]  : 1995 Date 3/4/2023       LOS (days): 2  Geometric Mean LOS (GMLOS) (days):   Days to GMLOS:        OBJECTIVE:        Current admission status: Inpatient  Preferred Pharmacy:   [place] [address] [place], [address],  [address] [address]  Phone: [phone] Fax: [phone]    Primary Care Provider: No primary care provider on file  Primary Insurance: [address]  Secondary Insurance:     PROGRESS NOTE:  CM was in contact with [address] [facility]  [facility] who reported the Pt and baby was seen and assessed by  Jerry Barton, and cleared for d/c with a follow up within the home environment  CM delivered a Spectra 2 to the Pt's room prior to her d/c  Pt and baby are cleared of any further CM needs or interventions at time  CM made the RN Juanjose aware

## 2023-03-04 NOTE — PLAN OF CARE
Problem: POSTPARTUM  Goal: Experiences normal postpartum course  Description: INTERVENTIONS:  - Monitor maternal vital signs  - Assess uterine involution and lochia  Outcome: Adequate for Discharge  Goal: Appropriate maternal -  bonding  Description: INTERVENTIONS:  - Identify family support  - Assess for appropriate maternal/infant bonding   -Encourage maternal/infant bonding opportunities  - Referral to  or  as needed  Outcome: Adequate for Discharge  Goal: Establishment of infant feeding pattern  Description: INTERVENTIONS:  - Assess breast/bottle feeding  - Refer to lactation as needed  Outcome: Adequate for Discharge  Goal: Incision(s), wounds(s) or drain site(s) healing without S/S of infection  Description: INTERVENTIONS  - Assess and document dressing, incision, wound bed, drain sites and surrounding tissue  - Provide patient and family education    Outcome: Adequate for Discharge     Problem: PAIN - ADULT  Goal: Verbalizes/displays adequate comfort level or baseline comfort level  Description: Interventions:  - Encourage patient to monitor pain and request assistance  - Assess pain using appropriate pain scale  - Administer analgesics based on type and severity of pain and evaluate response  - Implement non-pharmacological measures as appropriate and evaluate response  - Consider cultural and social influences on pain and pain management  - Notify physician/advanced practitioner if interventions unsuccessful or patient reports new pain  Outcome: Adequate for Discharge     Problem: INFECTION - ADULT  Goal: Absence or prevention of progression during hospitalization  Description: INTERVENTIONS:  - Assess and monitor for signs and symptoms of infection  - Monitor lab/diagnostic results  - Monitor all insertion sites, i e  indwelling lines, tubes, and drains  - Monitor endotracheal if appropriate and nasal secretions for changes in amount and color  - [place] appropriate cooling/warming therapies per order  - Administer medications as ordered  - Instruct and encourage patient and family to use good hand hygiene technique  - Identify and instruct in appropriate isolation precautions for identified infection/condition  Outcome: Adequate for Discharge  Goal: Absence of fever/infection during neutropenic period  Description: INTERVENTIONS:  - Monitor WBC    Outcome: Adequate for Discharge     Problem: SAFETY ADULT  Goal: Patient will remain free of falls  Description: INTERVENTIONS:  - Educate patient/family on patient safety including physical limitations  - Instruct patient to call for assistance with activity   - Consult OT/PT to assist with strengthening/mobility   - Keep Call bell within reach  - Keep bed low and locked with side rails adjusted as appropriate  - Keep care items and personal belongings within reach  - Initiate and maintain comfort rounds  - Make Fall Risk Sign visible to staff    - Apply yellow socks and bracelet for high fall risk patients  - Consider moving patient to room near nurses station  Outcome: Adequate for Discharge  Goal: Maintain or return to baseline ADL function  Description: INTERVENTIONS:  -  Assess patient's ability to carry out ADLs; assess patient's baseline for ADL function and identify physical deficits which impact ability to perform ADLs (bathing, care of mouth/teeth, toileting, grooming, dressing, etc )  - Assess/evaluate cause of self-care deficits   - Assess range of motion  - Assess patient's mobility; develop plan if impaired  - Assess patient's need for assistive devices and provide as appropriate  - Encourage maximum independence but intervene and supervise when necessary  - Involve family in performance of ADLs  - Assess for home care needs following discharge   - Consider OT consult to assist with ADL evaluation and planning for discharge  - Provide patient education as appropriate  Outcome: Adequate for Discharge  Goal: Maintains/Returns to pre admission functional level  Description: INTERVENTIONS:  - Perform BMAT or MOVE assessment daily    - Set and communicate daily mobility goal to care team and patient/family/caregiver  - Collaborate with rehabilitation services on mobility goals if consulted  - Out of bed for toileting  - Record patient progress and toleration of activity level   Outcome: Adequate for Discharge     Problem: Knowledge Deficit  Goal: Patient/family/caregiver demonstrates understanding of disease process, treatment plan, medications, and discharge instructions  Description: Complete learning assessment and assess knowledge base    Interventions:  - Provide teaching at level of understanding  - Provide teaching via preferred learning methods  Outcome: Adequate for Discharge     Problem: DISCHARGE PLANNING  Goal: Discharge to home or other facility with appropriate resources  Description: INTERVENTIONS:  - Identify barriers to discharge w/patient and caregiver  - Arrange for needed discharge resources and transportation as appropriate  - Identify discharge learning needs (meds, wound care, etc )  - Arrange for interpretive services to assist at discharge as needed  - Refer to Case Management Department for coordinating discharge planning if the patient needs post-hospital services based on physician/advanced practitioner order or complex needs related to functional status, cognitive ability, or social support system  Outcome: Adequate for Discharge

## 2023-03-05 LAB
DME PARACHUTE DELIVERY DATE ACTUAL: NORMAL
DME PARACHUTE DELIVERY DATE REQUESTED: NORMAL
DME PARACHUTE ITEM DESCRIPTION: NORMAL
DME PARACHUTE ORDER STATUS: NORMAL
DME PARACHUTE SUPPLIER NAME: NORMAL
DME PARACHUTE SUPPLIER PHONE: NORMAL

## 2023-03-06 NOTE — UTILIZATION REVIEW
NOTIFICATION OF ADMISSION DISCHARGE   This is a Notification of Discharge from [facility]  Please be advised that this patient has been discharge from our facility  Below you will find the admission and discharge date and time including the patient’s disposition  UTILIZATION REVIEW CONTACT:  Karina Cooper  Utilization   Network Utilization Review Department  Phone: [phone] x carefully listen to the prompts  All voicemails are confidential   Email: [email or web address]     ADMISSION INFORMATION  PRESENTATION DATE: 3/2/2023  3:31 AM  OBERVATION ADMISSION DATE:   INPATIENT ADMISSION DATE: 3/2/23  4:39 AM   DISCHARGE DATE: 3/4/2023  3:40 PM   DISPOSITION:Home/Self Care    IMPORTANT INFORMATION:  Send all requests for admission clinical reviews, approved or denied determinations and any other requests to dedicated fax number below belonging to the campus where the patient is receiving treatment   List of dedicated fax numbers:  [address] DENIALS (Administrative/Medical Necessity) [phone]   [address] (Maternity/NICU/Pediatrics) [phone]   [facility] [phone]   [place] [address] [phone]   [facility] [phone]   [facility] [phone]   [address] [place] [address] [phone]   [address] [place] [address] 119 [phone]   [facility]  [phone] [facility] [phone]   [facility] [phone]